# Patient Record
Sex: FEMALE | Race: WHITE | Employment: FULL TIME | URBAN - METROPOLITAN AREA
[De-identification: names, ages, dates, MRNs, and addresses within clinical notes are randomized per-mention and may not be internally consistent; named-entity substitution may affect disease eponyms.]

---

## 2018-05-09 ENCOUNTER — ANESTHESIA EVENT (OUTPATIENT)
Dept: GASTROENTEROLOGY | Facility: CLINIC | Age: 58
End: 2018-05-09
Payer: COMMERCIAL

## 2018-05-09 NOTE — ANESTHESIA PREPROCEDURE EVALUATION
Anesthesia Evaluation     . Pt has had prior anesthetic.            ROS/MED HX    ENT/Pulmonary:     (+)tobacco use, Current use , . .    Neurologic:       Cardiovascular:  - neg cardiovascular ROS       METS/Exercise Tolerance:     Hematologic:  - neg hematologic  ROS       Musculoskeletal:   (+) , , other musculoskeletal- lumbar stenosis       GI/Hepatic:  - neg GI/hepatic ROS       Renal/Genitourinary:  - ROS Renal section negative       Endo:     (+) type II DM .      Psychiatric:  - neg psychiatric ROS       Infectious Disease:  - neg infectious disease ROS       Malignancy:      - no malignancy   Other:    - neg other ROS                 Physical Exam  Normal systems: cardiovascular, pulmonary and dental    Airway   Mallampati: II  TM distance: >3 FB  Neck ROM: full    Dental     Cardiovascular       Pulmonary                     Anesthesia Plan      History & Physical Review  History and physical reviewed and following examination; no interval change.    ASA Status:  2 .        Plan for MAC Reason for MAC:  Difficulty with conscious sedation (QS)         Postoperative Care      Consents  Anesthetic plan, risks, benefits and alternatives discussed with:  Patient..                          .   Health Maintenance Due   Topic Date Due    Lipid Panel  1966    Colonoscopy  02/02/2016    Influenza Vaccine  08/01/2017

## 2018-05-21 RX ORDER — LISINOPRIL AND HYDROCHLOROTHIAZIDE 12.5; 2 MG/1; MG/1
2 TABLET ORAL
COMMUNITY
Start: 2017-12-20 | End: 2024-05-13

## 2018-05-21 RX ORDER — ALBUTEROL SULFATE 90 UG/1
1-2 AEROSOL, METERED RESPIRATORY (INHALATION)
COMMUNITY
Start: 2017-12-20

## 2018-05-21 RX ORDER — METOPROLOL SUCCINATE 50 MG/1
50 TABLET, EXTENDED RELEASE ORAL
COMMUNITY
Start: 2017-12-20 | End: 2024-05-13

## 2018-05-21 RX ORDER — METFORMIN HCL 500 MG
1000 TABLET, EXTENDED RELEASE 24 HR ORAL
COMMUNITY
Start: 2017-12-20 | End: 2024-05-13

## 2018-05-21 RX ORDER — ATORVASTATIN CALCIUM 40 MG/1
40 TABLET, FILM COATED ORAL
COMMUNITY
Start: 2017-12-20 | End: 2024-05-13

## 2018-05-21 RX ORDER — ASPIRIN 81 MG/1
TABLET ORAL
COMMUNITY
Start: 2012-09-05 | End: 2024-05-13

## 2018-05-22 ASSESSMENT — LIFESTYLE VARIABLES: TOBACCO_USE: 1

## 2018-05-23 ENCOUNTER — ANESTHESIA (OUTPATIENT)
Dept: GASTROENTEROLOGY | Facility: CLINIC | Age: 58
End: 2018-05-23
Payer: COMMERCIAL

## 2018-05-23 ENCOUNTER — HOSPITAL ENCOUNTER (OUTPATIENT)
Facility: CLINIC | Age: 58
Discharge: HOME | End: 2018-05-23
Attending: SURGERY | Admitting: SURGERY
Payer: COMMERCIAL

## 2018-05-23 ENCOUNTER — SURGERY (OUTPATIENT)
Age: 58
End: 2018-05-23

## 2018-05-23 VITALS
SYSTOLIC BLOOD PRESSURE: 133 MMHG | HEIGHT: 63 IN | OXYGEN SATURATION: 93 % | DIASTOLIC BLOOD PRESSURE: 73 MMHG | RESPIRATION RATE: 16 BRPM | BODY MASS INDEX: 35.44 KG/M2 | TEMPERATURE: 98.8 F | WEIGHT: 200 LBS

## 2018-05-23 LAB
COLONOSCOPY: NORMAL
GLUCOSE BLDC GLUCOMTR-MCNC: 342 MG/DL (ref 70–99)

## 2018-05-23 PROCEDURE — 45378 DIAGNOSTIC COLONOSCOPY: CPT | Performed by: SURGERY

## 2018-05-23 PROCEDURE — G0121 COLON CA SCRN NOT HI RSK IND: HCPCS | Performed by: SURGERY

## 2018-05-23 PROCEDURE — 37000008 ZZH ANESTHESIA TECHNICAL FEE, 1ST 30 MIN: Performed by: SURGERY

## 2018-05-23 PROCEDURE — 25000125 ZZHC RX 250: Performed by: NURSE ANESTHETIST, CERTIFIED REGISTERED

## 2018-05-23 PROCEDURE — 25000128 H RX IP 250 OP 636: Performed by: NURSE ANESTHETIST, CERTIFIED REGISTERED

## 2018-05-23 PROCEDURE — 82962 GLUCOSE BLOOD TEST: CPT

## 2018-05-23 PROCEDURE — 25000128 H RX IP 250 OP 636: Performed by: SURGERY

## 2018-05-23 RX ORDER — SODIUM CHLORIDE, SODIUM LACTATE, POTASSIUM CHLORIDE, CALCIUM CHLORIDE 600; 310; 30; 20 MG/100ML; MG/100ML; MG/100ML; MG/100ML
INJECTION, SOLUTION INTRAVENOUS CONTINUOUS
Status: DISCONTINUED | OUTPATIENT
Start: 2018-05-23 | End: 2018-05-23 | Stop reason: HOSPADM

## 2018-05-23 RX ORDER — ONDANSETRON 2 MG/ML
4 INJECTION INTRAMUSCULAR; INTRAVENOUS
Status: DISCONTINUED | OUTPATIENT
Start: 2018-05-23 | End: 2018-05-23 | Stop reason: HOSPADM

## 2018-05-23 RX ORDER — LIDOCAINE HYDROCHLORIDE 10 MG/ML
INJECTION, SOLUTION INFILTRATION; PERINEURAL PRN
Status: DISCONTINUED | OUTPATIENT
Start: 2018-05-23 | End: 2018-05-23

## 2018-05-23 RX ORDER — PROPOFOL 10 MG/ML
INJECTION, EMULSION INTRAVENOUS PRN
Status: DISCONTINUED | OUTPATIENT
Start: 2018-05-23 | End: 2018-05-23

## 2018-05-23 RX ORDER — PROPOFOL 10 MG/ML
INJECTION, EMULSION INTRAVENOUS CONTINUOUS PRN
Status: DISCONTINUED | OUTPATIENT
Start: 2018-05-23 | End: 2018-05-23

## 2018-05-23 RX ORDER — LIDOCAINE 40 MG/G
CREAM TOPICAL
Status: DISCONTINUED | OUTPATIENT
Start: 2018-05-23 | End: 2018-05-23 | Stop reason: HOSPADM

## 2018-05-23 RX ADMIN — LIDOCAINE HYDROCHLORIDE 50 MG: 10 INJECTION, SOLUTION INFILTRATION; PERINEURAL at 10:36

## 2018-05-23 RX ADMIN — PROPOFOL 200 MCG/KG/MIN: 10 INJECTION, EMULSION INTRAVENOUS at 10:35

## 2018-05-23 RX ADMIN — PROPOFOL 100 MG: 10 INJECTION, EMULSION INTRAVENOUS at 10:36

## 2018-05-23 RX ADMIN — SODIUM CHLORIDE, POTASSIUM CHLORIDE, SODIUM LACTATE AND CALCIUM CHLORIDE: 600; 310; 30; 20 INJECTION, SOLUTION INTRAVENOUS at 09:35

## 2018-05-23 RX ADMIN — LIDOCAINE HYDROCHLORIDE 0.5 ML: 10 INJECTION, SOLUTION EPIDURAL; INFILTRATION; INTRACAUDAL; PERINEURAL at 09:36

## 2018-05-23 NOTE — ANESTHESIA CARE TRANSFER NOTE
Patient: Viktoriya Handleyo    Procedure(s):  colonoscopy - Wound Class: II-Clean Contaminated    Diagnosis: screening  Diagnosis Additional Information: No value filed.    Anesthesia Type:   MAC     Note:  Airway :Room Air  Patient transferred to:Phase II  Handoff Report: Identifed the Patient, Identified the Reponsible Provider, Reviewed the pertinent medical history, Discussed the surgical course, Reviewed Intra-OP anesthesia mangement and issues during anesthesia, Set expectations for post-procedure period and Allowed opportunity for questions and acknowledgement of understanding      Vitals: (Last set prior to Anesthesia Care Transfer)    CRNA VITALS  5/23/2018 1026 - 5/23/2018 1056      5/23/2018             Pulse: 74    Ht Rate: 75    SpO2: 93 %                Electronically Signed By: PUJA Doshi CRNA  May 23, 2018  10:56 AM

## 2018-05-23 NOTE — ANESTHESIA POSTPROCEDURE EVALUATION
Patient: Viktoriya Emery    Procedure(s):  colonoscopy - Wound Class: II-Clean Contaminated    Diagnosis:screening  Diagnosis Additional Information: No value filed.    Anesthesia Type:  MAC    Note:  Anesthesia Post Evaluation    Patient location during evaluation: Bedside  Patient participation: Able to fully participate in evaluation  Level of consciousness: awake  Pain management: adequate  Airway patency: patent  Cardiovascular status: acceptable  Respiratory status: acceptable  Hydration status: acceptable  PONV: none     Anesthetic complications: None          Last vitals:  Vitals:    05/23/18 0913   BP: 141/74   Resp: 16   Temp: 37.1  C (98.8  F)   SpO2: 96%         Electronically Signed By: PUJA Doshi CRNA  May 23, 2018  10:57 AM

## 2023-04-01 ENCOUNTER — TRANSFERRED RECORDS (OUTPATIENT)
Dept: MULTI SPECIALTY CLINIC | Facility: CLINIC | Age: 63
End: 2023-04-01

## 2024-05-13 ENCOUNTER — OFFICE VISIT (OUTPATIENT)
Dept: FAMILY MEDICINE | Facility: CLINIC | Age: 64
End: 2024-05-13
Payer: COMMERCIAL

## 2024-05-13 VITALS
RESPIRATION RATE: 16 BRPM | OXYGEN SATURATION: 95 % | WEIGHT: 207.8 LBS | HEIGHT: 63 IN | BODY MASS INDEX: 36.82 KG/M2 | DIASTOLIC BLOOD PRESSURE: 80 MMHG | SYSTOLIC BLOOD PRESSURE: 150 MMHG | TEMPERATURE: 97.8 F | HEART RATE: 80 BPM

## 2024-05-13 DIAGNOSIS — E78.5 HYPERLIPIDEMIA LDL GOAL <100: ICD-10-CM

## 2024-05-13 DIAGNOSIS — I10 PRIMARY HYPERTENSION: ICD-10-CM

## 2024-05-13 DIAGNOSIS — Z79.4 TYPE 2 DIABETES MELLITUS WITH RETINOPATHY AND MACULAR EDEMA, WITH LONG-TERM CURRENT USE OF INSULIN, UNSPECIFIED LATERALITY, UNSPECIFIED RETINOPATHY SEVERITY (H): Primary | ICD-10-CM

## 2024-05-13 DIAGNOSIS — E66.812 CLASS 2 SEVERE OBESITY DUE TO EXCESS CALORIES WITH SERIOUS COMORBIDITY AND BODY MASS INDEX (BMI) OF 37.0 TO 37.9 IN ADULT (H): ICD-10-CM

## 2024-05-13 DIAGNOSIS — E66.01 CLASS 2 SEVERE OBESITY DUE TO EXCESS CALORIES WITH SERIOUS COMORBIDITY AND BODY MASS INDEX (BMI) OF 37.0 TO 37.9 IN ADULT (H): ICD-10-CM

## 2024-05-13 DIAGNOSIS — E11.311 TYPE 2 DIABETES MELLITUS WITH RETINOPATHY AND MACULAR EDEMA, WITH LONG-TERM CURRENT USE OF INSULIN, UNSPECIFIED LATERALITY, UNSPECIFIED RETINOPATHY SEVERITY (H): Primary | ICD-10-CM

## 2024-05-13 LAB
ANION GAP SERPL CALCULATED.3IONS-SCNC: 12 MMOL/L (ref 7–15)
BUN SERPL-MCNC: 16.4 MG/DL (ref 8–23)
CALCIUM SERPL-MCNC: 9.8 MG/DL (ref 8.8–10.2)
CHLORIDE SERPL-SCNC: 100 MMOL/L (ref 98–107)
CHOLEST SERPL-MCNC: 87 MG/DL
CREAT SERPL-MCNC: 0.94 MG/DL (ref 0.51–0.95)
DEPRECATED HCO3 PLAS-SCNC: 29 MMOL/L (ref 22–29)
EGFRCR SERPLBLD CKD-EPI 2021: 68 ML/MIN/1.73M2
FASTING STATUS PATIENT QL REPORTED: YES
FASTING STATUS PATIENT QL REPORTED: YES
GLUCOSE SERPL-MCNC: 338 MG/DL (ref 70–99)
HBA1C MFR BLD: 11.6 % (ref 0–5.6)
HDLC SERPL-MCNC: 30 MG/DL
LDLC SERPL CALC-MCNC: 5 MG/DL
NONHDLC SERPL-MCNC: 57 MG/DL
POTASSIUM SERPL-SCNC: 4.5 MMOL/L (ref 3.4–5.3)
SODIUM SERPL-SCNC: 141 MMOL/L (ref 135–145)
TRIGL SERPL-MCNC: 259 MG/DL

## 2024-05-13 PROCEDURE — 80061 LIPID PANEL: CPT | Performed by: NURSE PRACTITIONER

## 2024-05-13 PROCEDURE — 36415 COLL VENOUS BLD VENIPUNCTURE: CPT | Performed by: NURSE PRACTITIONER

## 2024-05-13 PROCEDURE — 80048 BASIC METABOLIC PNL TOTAL CA: CPT | Performed by: NURSE PRACTITIONER

## 2024-05-13 PROCEDURE — 83036 HEMOGLOBIN GLYCOSYLATED A1C: CPT | Performed by: NURSE PRACTITIONER

## 2024-05-13 PROCEDURE — 99204 OFFICE O/P NEW MOD 45 MIN: CPT | Performed by: NURSE PRACTITIONER

## 2024-05-13 RX ORDER — PEN NEEDLE, DIABETIC 32GX 5/32"
NEEDLE, DISPOSABLE MISCELLANEOUS SEE ADMIN INSTRUCTIONS
COMMUNITY
Start: 2024-03-28 | End: 2024-09-23

## 2024-05-13 RX ORDER — METOPROLOL SUCCINATE 50 MG/1
50 TABLET, EXTENDED RELEASE ORAL DAILY
Qty: 90 TABLET | Refills: 3 | Status: SHIPPED | OUTPATIENT
Start: 2024-05-13

## 2024-05-13 RX ORDER — BLOOD-GLUCOSE SENSOR
1 EACH MISCELLANEOUS
Qty: 6 EACH | Refills: 5 | Status: SHIPPED | OUTPATIENT
Start: 2024-05-13

## 2024-05-13 RX ORDER — LISINOPRIL AND HYDROCHLOROTHIAZIDE 12.5; 2 MG/1; MG/1
2 TABLET ORAL DAILY
Qty: 180 TABLET | Refills: 3 | Status: SHIPPED | OUTPATIENT
Start: 2024-05-13

## 2024-05-13 RX ORDER — INSULIN GLARGINE 100 [IU]/ML
INJECTION, SOLUTION SUBCUTANEOUS
COMMUNITY
End: 2024-05-13

## 2024-05-13 RX ORDER — LANCETS
EACH MISCELLANEOUS
COMMUNITY
Start: 2023-11-29

## 2024-05-13 RX ORDER — INSULIN GLARGINE 100 [IU]/ML
80 INJECTION, SOLUTION SUBCUTANEOUS 2 TIMES DAILY
Qty: 144 ML | Refills: 0 | Status: SHIPPED | OUTPATIENT
Start: 2024-05-13 | End: 2024-08-07

## 2024-05-13 RX ORDER — KETOROLAC TROMETHAMINE 30 MG/ML
1 INJECTION, SOLUTION INTRAMUSCULAR; INTRAVENOUS ONCE
Qty: 1 EACH | Refills: 0 | Status: SHIPPED | OUTPATIENT
Start: 2024-05-13 | End: 2024-05-13

## 2024-05-13 RX ORDER — BLOOD-GLUCOSE METER
EACH MISCELLANEOUS DAILY
COMMUNITY
Start: 2023-11-29

## 2024-05-13 RX ORDER — LIRAGLUTIDE 6 MG/ML
INJECTION SUBCUTANEOUS
COMMUNITY
End: 2024-05-13

## 2024-05-13 RX ORDER — ATORVASTATIN CALCIUM 40 MG/1
40 TABLET, FILM COATED ORAL DAILY
Qty: 90 TABLET | Refills: 3 | Status: SHIPPED | OUTPATIENT
Start: 2024-05-13

## 2024-05-13 SDOH — HEALTH STABILITY: PHYSICAL HEALTH: ON AVERAGE, HOW MANY MINUTES DO YOU ENGAGE IN EXERCISE AT THIS LEVEL?: 10 MIN

## 2024-05-13 SDOH — HEALTH STABILITY: PHYSICAL HEALTH: ON AVERAGE, HOW MANY DAYS PER WEEK DO YOU ENGAGE IN MODERATE TO STRENUOUS EXERCISE (LIKE A BRISK WALK)?: 2 DAYS

## 2024-05-13 ASSESSMENT — PAIN SCALES - GENERAL: PAINLEVEL: NO PAIN (0)

## 2024-05-13 ASSESSMENT — SOCIAL DETERMINANTS OF HEALTH (SDOH): HOW OFTEN DO YOU GET TOGETHER WITH FRIENDS OR RELATIVES?: NEVER

## 2024-05-13 NOTE — PROGRESS NOTES
Assessment & Plan     Type 2 diabetes mellitus with retinopathy and macular edema, with long-term current use of insulin, unspecified laterality, unspecified retinopathy severity (H)  Refilled Metformin, lantus and glucose supplies.  Ordered referral for diabetic education and freestyle mily reader for CBM.  I feel that her lantus dose is too high and that we may need to take a little closer look at her blood sugars in how they are doing throughout the day.  I would like to try to do Mounjaro if possible and insurance would pay to see if this improves blood sugars and see if we can reduce the lantus or reduce lantus and add short acting insulin at meals pending her tracking on the mily.  For now she can stay on the lantus and metformin until we find out her blood sugars through the day.  - metFORMIN (GLUCOPHAGE) 1000 MG tablet; Take 1,000 mg by mouth 2 times daily (with meals)  - blood glucose monitoring (SOFTCLIX) lancets; USE 1 TO CHECK GLUCOSE ONCE DAILY  - BD PEN NEEDLE PAWEL 2ND GEN 32G X 4 MM miscellaneous; See Admin Instructions  - Glucagon (GVOKE HYPOPEN) 1 MG/0.2ML pen; Inject subcutaneous.  - Blood Glucose Monitoring Suppl (ACCU-CHEK GUIDE ME) w/Device KIT; daily  - Hemoglobin A1c; Future  - tirzepatide (MOUNJARO) 2.5 MG/0.5ML pen; Inject 2.5 mg Subcutaneous every 7 days  - Continuous Glucose  (FREESTYLE MILY 3 READER) GLORIA; 1 each once for 1 dose Use to read blood sugars per 's instructions.  - Continuous Glucose Sensor (FREESTYLE MILY 3 SENSOR) MISC; 1 each every 14 days Use 1 sensor every 14 days. Use to read blood sugars per 's instructions.  - LANTUS SOLOSTAR 100 UNIT/ML soln; Inject 80 Units Subcutaneous 2 times daily for 90 days  - Adult Diabetes Education  Referral; Future  - Hemoglobin A1c    Class 2 severe obesity due to excess calories with serious comorbidity and body mass index (BMI) of 37.0 to 37.9 in adult (H)  - tirzepatide (MOUNJARO) 2.5 MG/0.5ML  pen; Inject 2.5 mg Subcutaneous every 7 days    Primary hypertension  BmP ordered for monitoring electrolytes and kidney function.  Refilled zestoretic and metoprolol.  BP's have been stable at home but elevate when she is here.  Advised to follow-up if they start to go above 140/90 consistently.  - Basic metabolic panel  (Ca, Cl, CO2, Creat, Gluc, K, Na, BUN); Future  - lisinopril-hydrochlorothiazide (ZESTORETIC) 20-12.5 MG tablet; Take 2 tablets by mouth daily  - metoprolol succinate ER (TOPROL XL) 50 MG 24 hr tablet; Take 1 tablet (50 mg) by mouth daily  - Basic metabolic panel  (Ca, Cl, CO2, Creat, Gluc, K, Na, BUN)    Hyperlipidemia LDL goal <100  Lipid ordered for monitoring cholesterol.  Refilled Lipitor for 1 year.  - Lipid panel reflex to direct LDL Non-fasting; Future  - atorvastatin (LIPITOR) 40 MG tablet; Take 1 tablet (40 mg) by mouth daily  - Lipid panel reflex to direct LDL Non-fasting    See Patient Instructions    Zi Sadler is a 63 year old, presenting for the following:  Physical        5/13/2024     1:16 PM   Additional Questions   Roomed by rmb   Accompanied by self         5/13/2024     1:16 PM   Patient Reported Additional Medications   Patient reports taking the following new medications none        Via the Health Maintenance questionnaire, the patient has reported the following services have been completed -Mammogram, this information has been sent to the abstraction team.  Health Care Directive  Patient does not have a Health Care Directive or Living Will: Discussed advance care planning with patient; information given to patient to review.    HPI    Diabetes Follow-up    How often are you checking your blood sugar? Not at all  What concerns do you have today about your diabetes? None   Do you have any of these symptoms? (Select all that apply)  Numbness in feet and Redness, sores, or blisters on feet  Patient has a healing blister on the right posterior heel that is not infected,  "there is moderate swelling around the ankle from this.    Hyperlipidemia Follow-Up    Are you regularly taking any medication or supplement to lower your cholesterol?   Yes- as she can due to health insurance issues  Are you having muscle aches or other side effects that you think could be caused by your cholesterol lowering medication?  No    Hypertension Follow-up    Do you check your blood pressure regularly outside of the clinic? Yes   Are you following a low salt diet? Yes  Are your blood pressures ever more than 140 on the top number (systolic) OR more   than 90 on the bottom number (diastolic), for example 140/90? Yes    BP Readings from Last 2 Encounters:   05/13/24 (!) 150/80   05/23/18 133/73       Review of Systems  CONSTITUTIONAL: NEGATIVE for fever, chills, change in weight  INTEGUMENTARY/SKIN: POSITIVE for normal healing blister on back of right heel from new shoes  RESP: NEGATIVE for significant cough or SOB  CV: NEGATIVE for chest pain, palpitations or peripheral edema  PSYCHIATRIC: NEGATIVE for changes in mood or affect  ROS otherwise negative     Objective    Exam  BP (!) 150/80   Pulse 80   Temp 97.8  F (36.6  C) (Tympanic)   Resp 16   Ht 1.588 m (5' 2.5\")   Wt 94.3 kg (207 lb 12.8 oz)   SpO2 95%   BMI 37.40 kg/m     Estimated body mass index is 37.4 kg/m  as calculated from the following:    Height as of this encounter: 1.588 m (5' 2.5\").    Weight as of this encounter: 94.3 kg (207 lb 12.8 oz).    Physical Exam  GENERAL: alert and no distress  EYES: Eyes grossly normal to inspection, PERRL and conjunctivae and sclerae normal  HENT: ear canals and TM's normal, nose and mouth without ulcers or lesions  NECK: no adenopathy, no asymmetry, masses, or scars  RESP: lungs clear to auscultation - no rales, rhonchi or wheezes  CV: regular rate and rhythm, normal S1 S2, no S3 or S4, no murmur, click or rub, no peripheral edema  ABDOMEN: soft, nontender, no hepatosplenomegaly, no masses and bowel " sounds normal  MS: no gross musculoskeletal defects noted, no edema  SKIN: healing blister on posterior right heel, scab with some redness, no warmth and swelling in the ankle 1+ pitting  NEURO: Normal strength and tone, mentation intact and speech normal  BACK: no CVA tenderness, no paralumbar tenderness  PSYCH: mentation appears normal, affect normal/bright  LYMPH: normal ant/post cervical, supraclavicular nodes  Diabetic foot exam: normal DP and PT pulses, no trophic changes or ulcerative lesions, and blister scabbed and healing on the right posterior heel, pt has some change in sensation in the pinky toe of the right foot and heel and on the left foot sensation changes in the heel      Signed Electronically by: Yessi Gray NP

## 2024-05-13 NOTE — PATIENT INSTRUCTIONS
Refilled your medications today.  Ordered Freestyle joselyn continuous blood glucose monitor.  Watch BP at home if elevated consistently over 140/90 follow-up in clinic.  Pending A1C recheck in 3-6 months. Work with diabetic education on insulin and we will try to add in Mounjaro to see if this is covered and reduce need for insulin.

## 2024-05-14 DIAGNOSIS — E66.01 CLASS 2 SEVERE OBESITY DUE TO EXCESS CALORIES WITH SERIOUS COMORBIDITY AND BODY MASS INDEX (BMI) OF 37.0 TO 37.9 IN ADULT (H): ICD-10-CM

## 2024-05-14 DIAGNOSIS — Z79.4 TYPE 2 DIABETES MELLITUS WITH RETINOPATHY AND MACULAR EDEMA, WITH LONG-TERM CURRENT USE OF INSULIN, UNSPECIFIED LATERALITY, UNSPECIFIED RETINOPATHY SEVERITY (H): ICD-10-CM

## 2024-05-14 DIAGNOSIS — E66.812 CLASS 2 SEVERE OBESITY DUE TO EXCESS CALORIES WITH SERIOUS COMORBIDITY AND BODY MASS INDEX (BMI) OF 37.0 TO 37.9 IN ADULT (H): ICD-10-CM

## 2024-05-14 DIAGNOSIS — E11.311 TYPE 2 DIABETES MELLITUS WITH RETINOPATHY AND MACULAR EDEMA, WITH LONG-TERM CURRENT USE OF INSULIN, UNSPECIFIED LATERALITY, UNSPECIFIED RETINOPATHY SEVERITY (H): ICD-10-CM

## 2024-05-15 ENCOUNTER — TELEPHONE (OUTPATIENT)
Dept: FAMILY MEDICINE | Facility: CLINIC | Age: 64
End: 2024-05-15
Payer: COMMERCIAL

## 2024-05-15 DIAGNOSIS — Z79.4 TYPE 2 DIABETES MELLITUS WITH RETINOPATHY AND MACULAR EDEMA, WITH LONG-TERM CURRENT USE OF INSULIN, UNSPECIFIED LATERALITY, UNSPECIFIED RETINOPATHY SEVERITY (H): Primary | ICD-10-CM

## 2024-05-15 DIAGNOSIS — E11.311 TYPE 2 DIABETES MELLITUS WITH RETINOPATHY AND MACULAR EDEMA, WITH LONG-TERM CURRENT USE OF INSULIN, UNSPECIFIED LATERALITY, UNSPECIFIED RETINOPATHY SEVERITY (H): Primary | ICD-10-CM

## 2024-05-15 NOTE — TELEPHONE ENCOUNTER
Medication Question or Refill       Yessi Gray NP  5/13/2024  5:04 PM CDT       Your triglycerides are elevated.  Start watching your fat intake in your diet and try using olive oil instead of butter for cooking, etc.  Your electrolytes and kidney function are normal except your blood sugar is elevated and your A1C is elevated to 11.6%. Start the Mounjaro and follow-up with diabetic education as soon as you can to work further on insulin until we get clearance for the new medication, we may need to start mealtime short acting insulin until you get on the Mounjaro and it starts working for you.  Continue to watch your diet to reduce sugar and carbs.  Plan to recheck with me in 3 months or sooner if blood sugars are not coming down.  Yessi Gray NP on 5/13/2024 at 5:03 PM     Patient asking about short acting insulin, scheduled follow up for Aug. 15. Transferred patient to diabetic education. She explained that mounjaro needs a prior auth/availability.      [facility] [address] [place] [address] - [address]   [address] [facility] [address]  Phone: [phone] Fax: [phone]    Okay to leave a detailed message?: Yes at Cell number on file:    Telephone Information:   Mobile [phone]

## 2024-05-15 NOTE — LETTER
Bria 3, 2024      Viktoriya Land  [address] [facility] [address]        Attn: Appeals    Please reconsider coverage of patient's Mounjaro prescription. Patient has tried victoza and was not successful on this which is the same GLP-1 as Trulicity and the Mounjaro provides GLP-1 plus GIP to offer more coverage for diabetes.         Sincerely,        Yessi Gray, NP

## 2024-05-16 NOTE — TELEPHONE ENCOUNTER
Prior Authorization Retail Medication Request    Medication/Dose: mounjaro  Diagnosis and ICD code (if different than what is on RX):    New/renewal/insurance change PA/secondary ins. PA:  Previously Tried and Failed:  Levemir; lantus; metformin; victoza  Rationale:      Insurance   Primary: not provided  Insurance ID:  not provided  CMM Key: [id number]    Secondary (if applicable):  Insurance ID:      Pharmacy Information (if different than what is on RX)  Name:    Phone:    Fax:

## 2024-05-17 NOTE — TELEPHONE ENCOUNTER
Please see messages below.    Would you like to start mealtime short acting insulin until you get on the Mounjaro?    The PA has been started.    Thank you    Yessi MOONEY RN

## 2024-05-17 NOTE — TELEPHONE ENCOUNTER
Patient is not compliant taking her insulin daily which is why I am trying to get her on the Mounjaro.      Let her know to continue to take her insulin nightly until she can start the Mounjaro and then she can wean down if this is improving her numbers.  She should have an appointment set up with diabetic education to help get started on this and reducing her insulin.       Yessi Gray NP on 5/17/2024 at 11:10 AM

## 2024-05-29 NOTE — TELEPHONE ENCOUNTER
Retail Pharmacy Prior Authorization Team   Phone: [phone]    PA Initiation    Medication: MOUNJARO 2.5 MG/0.5ML SC SOPN  Insurance Company: [employer] ([employer]) - Phone [phone] Fax [phone]  Pharmacy Filling the Rx: [facility] [address] [place] [address] - [address]  Filling Pharmacy Phone: [phone]  Filling Pharmacy Fax:    Start Date: 5/29/2024

## 2024-05-29 NOTE — TELEPHONE ENCOUNTER
Note: Due to record-high volumes, our turn-around time is taking longer than usual . We are currently 2 weeks behind in the pools.   We are working diligently to submit all requests in a timely manner and in the order they are received. Please only flag TRUE URGENT requests as high priority to the pool at this time.   If you have questions on status of PA's,  please send a note/message in the active PA encounter and send back to the [facility] PA pool [[phone]].    If you have questions about the turn-around time or about our process, please reach out to our supervisor Radha Guerra.   Thank you!   RPPA (Retail Pharmacy Prior Authorization) team    We are currently submitting requests we received on 05/16/2024    PRIOR AUTHORIZATION DENIED    Medication: MOUNJARO 2.5 MG/0.5ML SC SOPN  Insurance Company: [employer] ([employer]) - Phone [phone] Fax [phone]  Denial Date: 5/29/2024  Denial Rational:         Appeal Information:   If provider would like to appeal please review the plan's reasons for denial listed above. Please utilize that information to complete letter and provide specific, detailed clinical information/rationale of your patient's health status to address their denial reasons.    Patient Notified: No

## 2024-05-30 NOTE — TELEPHONE ENCOUNTER
I would like to push through a letter to the insurance company since patient has tried victoza and was not successful on this which is the same GLP-1 as Trulicity and the Mounjaro provides GLP-1 plus GIP to offer more coverage for diabetes.  Yessi Gray NP on 5/30/2024 at 7:32 AM

## 2024-06-04 NOTE — TELEPHONE ENCOUNTER
This looks great.  Please proceed with sending this to the insurance company.  Yessi Gray NP on 6/4/2024 at 4:51 AM

## 2024-06-04 NOTE — TELEPHONE ENCOUNTER
Yessi,  Please see appeal letter that I started and add/delete as needed.  Thank you,  Luci Leyva    (Sorry for delay I was on vacation)

## 2024-06-06 NOTE — TELEPHONE ENCOUNTER
Medication Appeal Initiation    Medication: MOUNJARO 2.5 MG/0.5ML SC SOPN  Appeal Start Date:  6/6/2024  Insurance Company:   Insurance Phone:   Insurance Fax:   Comments:

## 2024-06-07 NOTE — TELEPHONE ENCOUNTER
Note: Due to record-high volumes, our turn-around time is taking longer than usual . We are currently 2 weeks behind in the pools.   We are working diligently to submit all requests in a timely manner and in the order they are received. Please only flag TRUE URGENT requests as high priority to the pool at this time.   If you have questions on status of PA's,  please send a note/message in the active PA encounter and send back to the [facility] PA pool [[phone]].    If you have questions about the turn-around time or about our process, please reach out to our supervisor Radha Guerra.   Thank you!   RPPA (Retail Pharmacy Prior Authorization) team    We are currently submitting requests we received on 05/29/2024    MEDICATION APPEAL DENIED    Medication: MOUNJARO 2.5 MG/0.5ML SC SOPN  Insurance Company:   Denial Date: 6/6/2024  Denial Reason(s):       Central Prior Authorization Team ONLY: Second level appeals will be managed by the clinic staff and provider. Please contact the [employer] Prior Authorization Team if additional information about the denial is needed.  Second Level Appeal Information:

## 2024-06-11 ENCOUNTER — TELEPHONE (OUTPATIENT)
Dept: FAMILY MEDICINE | Facility: CLINIC | Age: 64
End: 2024-06-11
Payer: COMMERCIAL

## 2024-06-11 NOTE — TELEPHONE ENCOUNTER
Contacted patient and let her know of the change in medication sent to pharmacy.   Understood with no questions.   Kathy Rivas, CMA

## 2024-06-11 NOTE — TELEPHONE ENCOUNTER
I have ordered the Trulicity.  Please notify patient of the change due to insurance.  Yessi Gray NP on 6/11/2024 at 7:54 AM

## 2024-06-12 NOTE — TELEPHONE ENCOUNTER
Prior Authorization Retail Medication Request    Medication/Dose: trulicity  Diagnosis and ICD code (if different than what is on RX):    New/renewal/insurance change PA/secondary ins. PA:  Previously Tried and Failed:  Levemir; lantus; metformin; victoza   Rationale:  per recent denial for mounjaro patient needed to try and fail Trulicity    Insurance   Primary: not provided  Insurance ID:  not provided  CM Key: [id number]    Secondary (if applicable):  Insurance ID:      Pharmacy Information (if different than what is on RX)  Name:    Phone:    Fax:

## 2024-06-19 NOTE — TELEPHONE ENCOUNTER
Retail Pharmacy Prior Authorization Team   Phone: [phone]    Prior Authorization Approval    Medication: TRULICITY 0.75 MG/0.5ML SC SOPN  Authorization Effective Date: 5/20/2024  Authorization Expiration Date: 6/19/2025     Insurance Company: [employer] Non-Specialty PA's - Phone [phone] Fax [phone]  Which Pharmacy is filling the prescription: [facility] [address] [place] [address] [address]  Pharmacy Notified: YES  Patient Notified: YES **Instructed pharmacy to notify patient when script is ready to /ship.**

## 2024-06-21 ENCOUNTER — TELEPHONE (OUTPATIENT)
Dept: FAMILY MEDICINE | Facility: CLINIC | Age: 64
End: 2024-06-21
Payer: COMMERCIAL

## 2024-06-21 NOTE — LETTER
June 21, 2024    To  Viktoriya Land  [address] [facility] [address]    Your team at [facility] cares about your health. We have reviewed your chart and based on our findings; we are making the following recommendations to better manage your health.     You are in particular need of attention regarding the following:     Schedule a primary care office visit with your provider for a Pap Smear to screen for Cervical Cancer.  PREVENTATIVE VISIT: Physical    If you have already completed these items, please contact the clinic via phone or   [employer] so your care team can review and update your records. Thank you for   choosing [facility] for your healthcare needs. For any questions,   concerns, or to schedule an appointment please contact our clinic.    Healthy Regards,      Your [facility] Care Team

## 2024-06-21 NOTE — TELEPHONE ENCOUNTER
Patient Quality Outreach    Patient is due for the following:   Cervical Cancer Screening - PAP Needed  Physical Preventive Adult Physical    Next Steps:   Pt to schedule    Type of outreach:    Sent letter.      Questions for provider review:    None           Daniel Guallpa

## 2024-08-07 DIAGNOSIS — Z79.4 TYPE 2 DIABETES MELLITUS WITH RETINOPATHY AND MACULAR EDEMA, WITH LONG-TERM CURRENT USE OF INSULIN, UNSPECIFIED LATERALITY, UNSPECIFIED RETINOPATHY SEVERITY (H): ICD-10-CM

## 2024-08-07 DIAGNOSIS — E11.311 TYPE 2 DIABETES MELLITUS WITH RETINOPATHY AND MACULAR EDEMA, WITH LONG-TERM CURRENT USE OF INSULIN, UNSPECIFIED LATERALITY, UNSPECIFIED RETINOPATHY SEVERITY (H): ICD-10-CM

## 2024-08-07 RX ORDER — INSULIN GLARGINE 100 [IU]/ML
INJECTION, SOLUTION SUBCUTANEOUS
Qty: 144 ML | Refills: 0 | Status: SHIPPED | OUTPATIENT
Start: 2024-08-07

## 2024-08-07 NOTE — TELEPHONE ENCOUNTER
Has appointment scheduled for 8/15/24.      Requested Prescriptions   Pending Prescriptions Disp Refills    LANTUS SOLOSTAR 100 UNIT/ML soln [Pharmacy Med Name: Lantus SoloStar 100 UNIT/ML Subcutaneous Solution Pen-injector] 144 mL 0     Sig: INJECT 80 UNITS SUBCUTANEOUSLY TWICE DAILY       Insulin Protocol Failed - 8/7/2024  5:15 PM        Failed - Chart Review Required     Review Chart.    Do not approve if insulin is used in a pump.  Instead, direct refill request to the patient's endocrinologist.  If the patient doesn't have an endocrinologist, then send the refill to the patient's PCP for review            Passed - Medication is active on med list        Passed - Has GFR on file in past 12 months and most recent value is normal        Passed - Recent (6 mo) or future (90 days) visit within the authorizing provider's specialty     The patient must have completed an in-person or virtual visit within the past 6 months or has a future visit scheduled within the next 90 days with the authorizing provider s specialty.  Urgent care and e-visits do not quality as an office visit for this protocol.          Passed - Medication indicated for associated diagnosis     Medication is associated with one or more of the following diagnoses:   - Type 1 diabetes mellitus  - Type 2 diabetes mellitus  - Diabetic nephropathy; Prophylaxis  - Neuropathy due to diabetes mellitus; Prophylaxis  - Retinopathy due to diabetes mellitus; Prophylaxis  - Diabetes mellitus associated with cystic fibrosis  - Disorder of cardiovascular system; Prophylaxis - Type 1 diabetes mellitus   - Disorder of cardiovascular system; Prophylaxis - Type 2 diabetes mellitus            Passed - Patient is 18 years of age or older

## 2024-09-19 ENCOUNTER — ALLIED HEALTH/NURSE VISIT (OUTPATIENT)
Dept: EDUCATION SERVICES | Facility: CLINIC | Age: 64
End: 2024-09-19
Payer: COMMERCIAL

## 2024-09-19 DIAGNOSIS — Z79.4 TYPE 2 DIABETES MELLITUS WITH RETINOPATHY AND MACULAR EDEMA, WITH LONG-TERM CURRENT USE OF INSULIN (H): Primary | ICD-10-CM

## 2024-09-19 DIAGNOSIS — Z79.4 TYPE 2 DIABETES MELLITUS WITH RETINOPATHY AND MACULAR EDEMA, WITH LONG-TERM CURRENT USE OF INSULIN, UNSPECIFIED LATERALITY, UNSPECIFIED RETINOPATHY SEVERITY (H): ICD-10-CM

## 2024-09-19 DIAGNOSIS — E11.311 TYPE 2 DIABETES MELLITUS WITH RETINOPATHY AND MACULAR EDEMA, WITH LONG-TERM CURRENT USE OF INSULIN (H): Primary | ICD-10-CM

## 2024-09-19 DIAGNOSIS — E11.311 TYPE 2 DIABETES MELLITUS WITH RETINOPATHY AND MACULAR EDEMA, WITH LONG-TERM CURRENT USE OF INSULIN, UNSPECIFIED LATERALITY, UNSPECIFIED RETINOPATHY SEVERITY (H): ICD-10-CM

## 2024-09-19 PROCEDURE — G0108 DIAB MANAGE TRN  PER INDIV: HCPCS | Performed by: DIETITIAN, REGISTERED

## 2024-09-19 RX ORDER — BLOOD SUGAR DIAGNOSTIC
STRIP MISCELLANEOUS
Qty: 100 STRIP | Refills: 11 | Status: SHIPPED | OUTPATIENT
Start: 2024-09-19

## 2024-09-19 NOTE — Clinical Note
9/19/2024         RE: Viktoriya Land  [address]  [facility] [address]        Dear Colleague,    Thank you for referring your patient, Viktoriya Land, to the [facility] [place]. Please see a copy of my visit note below.    Diabetes Self-Management Education & Support  Presents for: Individual review  Type of Service: In Person Visit      ASSESSMENT:  Transferred care from [facility] to [facility] with a change in insurance and established with Yessi Gray, will follow up next week.  Has worked with Diabetes ed at [facility] in the past.  Reviewed pertinent history.  Was on Victoza and did well on it now has switched to Trulicity (Mounjaro was denied).   Trulicity not in medication list; guessing it is the 0.75mg dose and can likely increase.       Takes Trulicity on Tuesdays or wednesdays and thinks it is 110$ for 4 pens.  Signed up for and printed the copay card today to see if this can reduce the copay.      BD needles were 25$/box.  Tries not to reuse needles much, however currently spending 1$/day.  Recommend asking the pharmacist for relion needles 50 for 9$     If capped at 35$ copay as it should be and on formulary; recommend switching toTresiba U200.  Dosed once daily instead of twice daily (less injections).   Ultra long acting - flatter background profile in action.   Plus savings with less needles.     First 2 joselyn sensors fell off but had purchased 6, so has 4 more. Reviewed use, adhesion, alarms and the reader.  These were more expensive (not covered or has a high deductible).  Discussed intermittent sensor use to reduce cost and if Trulicity and needle cost reduces may be able to use that savings for sensors.     Accu check testing strips refilled,  not checking routinely, but was 92 this morning and feeling it. With working in the last 10 days much much more active with new job.  Is likely treating lower numbers (will eat when feeling shaky/low mid  "morning)  Recommend decreasing insulin from 80-0-0-80 to 74-0-0-74 due to more exercise and will likely be increasing Trulicity dose soon.  With Gilberto data will be able to better assess.     Patient's most recent   Lab Results   Component Value Date    A1C 11.6 05/13/2024     is not meeting goal of <7.0    Diabetes knowledge and skills assessment:   Patient is knowledgeable in diabetes management concepts related to: Healthy Eating, Being Active, Monitoring, Taking Medication, Problem Solving, Reducing Risks, and Healthy Coping  Continue education with the following diabetes management concepts: Monitoring, Taking Medication, Problem Solving, and Reducing Risks  Based on learning assessment above, most appropriate setting for further diabetes education would be: Individual setting.      PLAN          SUBJECTIVE/OBJECTIVE:  Presents for: Individual review  Accompanied by: Self  Diabetes education in the past 24mo: No  Focus of Visit: Monitoring, Taking Medication  Diabetes type: Type 2  Other concerns:: None  Cultural Influences/Ethnic Background:  Choose not to answer    Diabetes Symptoms & Complications:  Diabetes Related Symptoms: Neuropathy, Yeast infection     Patient Problem List and Family Medical History reviewed for relevant medical history, current medical status, and diabetes risk factors.    Vitals:  There were no vitals taken for this visit.  Estimated body mass index is 37.4 kg/m  as calculated from the following:    Height as of 5/13/24: 1.588 m (5' 2.5\").    Weight as of 5/13/24: 94.3 kg (207 lb 12.8 oz).   Last 3 BP:   BP Readings from Last 3 Encounters:   05/13/24 (!) 150/80   05/23/18 133/73     History   Smoking Status    Not on file   Smokeless Tobacco    Not on file     Labs:  Lab Results   Component Value Date    A1C 11.6 05/13/2024     Lab Results   Component Value Date     05/13/2024     Lab Results   Component Value Date    LDL 5 05/13/2024     Direct Measure HDL   Date Value Ref " "Range Status   05/13/2024 30 (L) >=50 mg/dL Final   ]  GFR Estimate   Date Value Ref Range Status   05/13/2024 68 >60 mL/min/1.73m2 Final     No results found for: \"GFRESTBLACK\"  Lab Results   Component Value Date    CR 0.94 05/13/2024   No results found for: \"MICROALBUMIN\"    Healthy Eating:  Healthy Eating Assessed Today: Yes  Cultural/Synagogue diet restrictions?: No    Being Active:  Being Active Assessed Today: Yes  Barrier to exercise: None    Monitoring:  Monitoring Assessed Today: Yes  Blood Glucose Meter: Accu-chek, FreeStyle  Times checking blood sugar at home (number): Never    Taking Medications:  Diabetes Medication(s)       Biguanides       metFORMIN (GLUCOPHAGE) 1000 MG tablet Take 1,000 mg by mouth 2 times daily (with meals)       Diabetic Other       Glucagon (GVOKE HYPOPEN) 1 MG/0.2ML pen Inject subcutaneous.       Insulin       LANTUS SOLOSTAR 100 UNIT/ML soln INJECT 80 UNITS SUBCUTANEOUSLY TWICE DAILY          Taking Medication Assessed Today: Yes  Current Treatments: Insulin Injections, Oral Medication (taken by mouth)    Problem Solving:  Not assessed at this visit      Reducing Risks:  Reducing Risks Assessed Today: Yes  Has dilated eye exam at least once a year?: No  Sees dentist every 6 months?: No  Feet checked by healthcare provider in the last year?: Yes    Healthy Coping:  Healthy Coping Assessed Today: Yes  Emotional response to diabetes: Ready to learn  Informal Support system:: Family, Significant other  Stage of change: ACTION (Actively working towards change)  Support resources: [employer]  Patient Activation Measure Survey Score:       No data to display              Care Plan and Education Provided:  Healthy Eating: Balanced meals, Being Active: Precautions to take with exercise and Relationship of activity to glucose, Monitoring: Blood glucose versus Continuous Glucose Monitoring, Frequency of monitoring, and Individual glucose targets, Taking Medication: Action of prescribed " medication(s) and Side effects of prescribed medication(s), Problem Solving: High glucose - causes, signs/symptoms, treatment and prevention, Low glucose - causes, signs/symptoms, treatment and prevention, Rule of 15 and carrying a carbohydrate source at all times in case of low glucose, and When to call a health care provider, and Reducing Risks: A1C - goals, relating to blood glucose levels, how often to check    Rachel Rosales RD, LD, [facility]  Diabetes Education    Time Spent: 45 minutes  Encounter Type: Individual    Any diabetes medication dose changes were made via the CDE Protocol per the patient's referring provider. A copy of this encounter was shared with the provider.            Diabetes Self-Management Education & Support  Presents for: Individual review  Type of Service: In Person Visit      ASSESSMENT:  Transferred care from [facility] to [facility] with a change in insurance and established with Yessi Gray, will follow up next week.  Has worked with Diabetes ed at [facility] in the past.  Reviewed pertinent history.  Was on Victoza and did well on it now has switched to Trulicity (Mounjaro was denied).   Trulicity not in medication list; guessing it is the 0.75mg dose; takes on Tuesdays or wednesdays and thinks it is 110$ for 4 pens.  Signed up for and printed the copay card today to see if this can reduce the copay of the Trulicity.   Can likely work Trulicity dose up further. Accu check testing strips refilled,  not checking blood sugars routinely, but was 92 this morning and feeling it. With working in the last 10 days much much more active with new job.  Is likely treating lower numbers (will eat when feeling shaky/low mid morning)  Recommend decreasing insulin from 80-0-0-80 to 74-0-0-74 due to more exercise and will likely be increasing Trulicity dose soon.  With Gilberto data will be able to better assess. First 2 gilberto sensors fell off but had purchased 6, so has 4 more. Reviewed use,  adhesion, alarms and the reader.  These were more expensive (not covered or has a high deductible).  Discussed intermittent sensor use to reduce cost and if Trulicity and needle cost reduces may be able to use that savings for sensors.   BD needles were 25$/box.  Tries not to reuse needles much, however currently spending 1$/day.  Recommend asking the pharmacist for relion needles 50 for 9$.  If capped at 35$ copay as it should be and on formulary; recommend switching toTresiba U200.  Dosed once daily instead of twice daily (less injections/less needles) and a better basal profile in action.  Recommend follow up after wearing a couple libres.  Viktoriya will call to schedule.       Patient's most recent   Lab Results   Component Value Date    A1C 11.6 05/13/2024     is not meeting goal of <7.0    Diabetes knowledge and skills assessment:   Patient is knowledgeable in diabetes management concepts related to: Healthy Eating, Being Active, Monitoring, Taking Medication, Problem Solving, Reducing Risks, and Healthy Coping  Continue education with the following diabetes management concepts: Monitoring, Taking Medication, Problem Solving, and Reducing Risks  Based on learning assessment above, most appropriate setting for further diabetes education would be: Individual setting.      PLAN  PCP visit 9/23; possibly increase Trulicity   Continue with positive diet & lifestyle changes    Resume FreeStyle Gilberto and make a follow up with Diabetes ed after wearing a couple of these   Consider changing from BID lantus to once daily Tresiba U200 (reduce total daily dose by 20% when making this switch)     SUBJECTIVE/OBJECTIVE:  Presents for: Individual review  Accompanied by: Self  Diabetes education in the past 24mo: No  Focus of Visit: Monitoring, Taking Medication  Diabetes type: Type 2  Other concerns:: None  Cultural Influences/Ethnic Background:  Choose not to answer    Diabetes Symptoms & Complications:  Diabetes Related  "Symptoms: Neuropathy, Yeast infection     Patient Problem List and Family Medical History reviewed for relevant medical history, current medical status, and diabetes risk factors.    Vitals:  There were no vitals taken for this visit.  Estimated body mass index is 37.4 kg/m  as calculated from the following:    Height as of 5/13/24: 1.588 m (5' 2.5\").    Weight as of 5/13/24: 94.3 kg (207 lb 12.8 oz).   Last 3 BP:   BP Readings from Last 3 Encounters:   05/13/24 (!) 150/80   05/23/18 133/73     History   Smoking Status     Not on file   Smokeless Tobacco     Not on file     Labs:  Lab Results   Component Value Date    A1C 11.6 05/13/2024     Lab Results   Component Value Date     05/13/2024     Lab Results   Component Value Date    LDL 5 05/13/2024     Direct Measure HDL   Date Value Ref Range Status   05/13/2024 30 (L) >=50 mg/dL Final   ]  GFR Estimate   Date Value Ref Range Status   05/13/2024 68 >60 mL/min/1.73m2 Final     No results found for: \"GFRESTBLACK\"  Lab Results   Component Value Date    CR 0.94 05/13/2024   No results found for: \"MICROALBUMIN\"    Healthy Eating:  Healthy Eating Assessed Today: Yes  Cultural/Mandaen diet restrictions?: No    Being Active:  Being Active Assessed Today: Yes  Barrier to exercise: None    Monitoring:  Monitoring Assessed Today: Yes  Blood Glucose Meter: Accu-chek, FreeStyle  Times checking blood sugar at home (number): Never    Taking Medications:  Diabetes Medication(s)       Biguanides       metFORMIN (GLUCOPHAGE) 1000 MG tablet Take 1,000 mg by mouth 2 times daily (with meals)       Diabetic Other       Glucagon (GVOKE HYPOPEN) 1 MG/0.2ML pen Inject subcutaneous.       Insulin       LANTUS SOLOSTAR 100 UNIT/ML soln INJECT 80 UNITS SUBCUTANEOUSLY TWICE DAILY          Taking Medication Assessed Today: Yes  Current Treatments: Insulin Injections, Oral Medication (taken by mouth)    Problem Solving:  Not assessed at this visit      Reducing Risks:  Reducing Risks " Assessed Today: Yes  Has dilated eye exam at least once a year?: No  Sees dentist every 6 months?: No  Feet checked by healthcare provider in the last year?: Yes    Healthy Coping:  Healthy Coping Assessed Today: Yes  Emotional response to diabetes: Ready to learn  Informal Support system:: Family, Significant other  Stage of change: ACTION (Actively working towards change)  Support resources: [employer]  Patient Activation Measure Survey Score:       No data to display              Care Plan and Education Provided:  Healthy Eating: Balanced meals, Being Active: Precautions to take with exercise and Relationship of activity to glucose, Monitoring: Blood glucose versus Continuous Glucose Monitoring, Frequency of monitoring, and Individual glucose targets, Taking Medication: Action of prescribed medication(s) and Side effects of prescribed medication(s), Problem Solving: High glucose - causes, signs/symptoms, treatment and prevention, Low glucose - causes, signs/symptoms, treatment and prevention, Rule of 15 and carrying a carbohydrate source at all times in case of low glucose, and When to call a health care provider, and Reducing Risks: A1C - goals, relating to blood glucose levels, how often to check    Rachel Rosales RD, RAJESH, [facility]  Diabetes Education    Time Spent: 45 minutes  Encounter Type: Individual    Any diabetes medication dose changes were made via the CDE Protocol per the patient's referring provider. A copy of this encounter was shared with the provider.

## 2024-09-19 NOTE — PATIENT INSTRUCTIONS
Ask the pharmacist for relion needles (over the counter.  See if they can get 4mm)           Trulicity savings card  - Yessi will likely increase the dose     Tresiba U200 - dosed once daily instead of twice daily (less injections!).   Ultra long acting - flatter background profile in action.  I will update Yessi on this possibility      Instead of 80 & 80  try 74 & 74 due to running lower in the morning     Keep up the great work!!         Fiber / gut health   https://health.[employer].org/gut-microbiome/  https://www.cdc.gov/diabetes/library/features/role-of-fiber.html  https://www.[employer]/newarchives/996522f05.shtml  https://www.[employer].org/healthy-lifestyle/recipes/high-fiber-recipes/rcs-28478389  https://[employer]Shield/easy-bean-and-lentil-recipes/  https://health.[employer].org/health-benefits-of-lentils/  https://health.[facility].org/whats-the-difference-between-soluble-and-insoluble-fiber/  https://www.[employer].org/healthy-lifestyle/nutrition-and-healthy-eating/in-depth/high-fiber-foods/art-27106012      Rachel Rosales RD, LD, [facility]  Diabetes Education    A Diabetes Education referral is good for a year and your provider can easily add another anytime.  We are here to help!  There are many areas a Diabetes Educator can help with including -  medication regimen review and adjustment, new medications, blood sugars, new technology, new meters, activity, food/diet, goal setting etc.       How to get a hold of us: Please reach out on [employer] anytime with questions.  Our direct scheduling line for appointments is: [phone].  We have virtual and in person options at many locations.  Additionally we have a non urgent M-F triage line for questions or to get a hold of a Diabetes educator : [phone]

## 2024-09-19 NOTE — PROGRESS NOTES
Diabetes Self-Management Education & Support  Presents for: Individual review  Type of Service: In Person Visit      ASSESSMENT:  Transferred care from [facility] to [facility] with a change in insurance and established with Yessi Gray, will follow up next week.  Has worked with Diabetes ed at [facility] in the past.  Reviewed pertinent history.  Was on Victoza and did well on it now has switched to Trulicity (Mounjaro was denied).   Trulicity not in medication list; guessing it is the 0.75mg dose; takes on Tuesdays or wednesdays and thinks it is 110$ for 4 pens.  Signed up for and printed the copay card today to see if this can reduce the copay of the Trulicity.   Can likely work Trulicity dose up further. Accu check testing strips refilled,  not checking blood sugars routinely, but was 92 this morning and feeling it. With working in the last 10 days much much more active with new job.  Is likely treating lower numbers (will eat when feeling shaky/low mid morning)  Recommend decreasing insulin from 80-0-0-80 to 74-0-0-74 due to more exercise and will likely be increasing Trulicity dose soon.  With Gilberto data will be able to better assess. First 2 gilberto sensors fell off but had purchased 6, so has 4 more. Reviewed use, adhesion, alarms and the reader.  These were more expensive (not covered or has a high deductible).  Discussed intermittent sensor use to reduce cost and if Trulicity and needle cost reduces may be able to use that savings for sensors.   BD needles were 25$/box.  Tries not to reuse needles much, however currently spending 1$/day.  Recommend asking the pharmacist for relion needles 50 for 9$.  If capped at 35$ copay as it should be and on formulary; recommend switching toTresiba U200.  Dosed once daily instead of twice daily (less injections/less needles) and a better basal profile in action.  Recommend follow up after wearing a couple libres.  Viktoriya will call to schedule.       Patient's most  "recent   Lab Results   Component Value Date    A1C 11.6 05/13/2024     is not meeting goal of <7.0    Diabetes knowledge and skills assessment:   Patient is knowledgeable in diabetes management concepts related to: Healthy Eating, Being Active, Monitoring, Taking Medication, Problem Solving, Reducing Risks, and Healthy Coping  Continue education with the following diabetes management concepts: Monitoring, Taking Medication, Problem Solving, and Reducing Risks  Based on learning assessment above, most appropriate setting for further diabetes education would be: Individual setting.      PLAN  PCP visit 9/23; possibly increase Trulicity   Continue with positive diet & lifestyle changes    Resume FreeStyle Gilberto and make a follow up with Diabetes ed after wearing a couple of these   Consider changing from BID lantus to once daily Tresiba U200 (reduce total daily dose by 20% when making this switch)     SUBJECTIVE/OBJECTIVE:  Presents for: Individual review  Accompanied by: Self  Diabetes education in the past 24mo: No  Focus of Visit: Monitoring, Taking Medication  Diabetes type: Type 2  Other concerns:: None  Cultural Influences/Ethnic Background:  Choose not to answer    Diabetes Symptoms & Complications:  Diabetes Related Symptoms: Neuropathy, Yeast infection     Patient Problem List and Family Medical History reviewed for relevant medical history, current medical status, and diabetes risk factors.    Vitals:  There were no vitals taken for this visit.  Estimated body mass index is 37.4 kg/m  as calculated from the following:    Height as of 5/13/24: 1.588 m (5' 2.5\").    Weight as of 5/13/24: 94.3 kg (207 lb 12.8 oz).   Last 3 BP:   BP Readings from Last 3 Encounters:   05/13/24 (!) 150/80   05/23/18 133/73     History   Smoking Status    Not on file   Smokeless Tobacco    Not on file     Labs:  Lab Results   Component Value Date    A1C 11.6 05/13/2024     Lab Results   Component Value Date     05/13/2024 " "    Lab Results   Component Value Date    LDL 5 05/13/2024     Direct Measure HDL   Date Value Ref Range Status   05/13/2024 30 (L) >=50 mg/dL Final   ]  GFR Estimate   Date Value Ref Range Status   05/13/2024 68 >60 mL/min/1.73m2 Final     No results found for: \"GFRESTBLACK\"  Lab Results   Component Value Date    CR 0.94 05/13/2024   No results found for: \"MICROALBUMIN\"    Healthy Eating:  Healthy Eating Assessed Today: Yes  Cultural/Nondenominational diet restrictions?: No    Being Active:  Being Active Assessed Today: Yes  Barrier to exercise: None    Monitoring:  Monitoring Assessed Today: Yes  Blood Glucose Meter: Accu-chek, FreeStyle  Times checking blood sugar at home (number): Never    Taking Medications:  Diabetes Medication(s)       Biguanides       metFORMIN (GLUCOPHAGE) 1000 MG tablet Take 1,000 mg by mouth 2 times daily (with meals)       Diabetic Other       Glucagon (GVOKE HYPOPEN) 1 MG/0.2ML pen Inject subcutaneous.       Insulin       LANTUS SOLOSTAR 100 UNIT/ML soln INJECT 80 UNITS SUBCUTANEOUSLY TWICE DAILY          Taking Medication Assessed Today: Yes  Current Treatments: Insulin Injections, Oral Medication (taken by mouth)    Problem Solving:  Not assessed at this visit      Reducing Risks:  Reducing Risks Assessed Today: Yes  Has dilated eye exam at least once a year?: No  Sees dentist every 6 months?: No  Feet checked by healthcare provider in the last year?: Yes    Healthy Coping:  Healthy Coping Assessed Today: Yes  Emotional response to diabetes: Ready to learn  Informal Support system:: Family, Significant other  Stage of change: ACTION (Actively working towards change)  Support resources: Websites  Patient Activation Measure Survey Score:       No data to display              Care Plan and Education Provided:  Healthy Eating: Balanced meals, Being Active: Precautions to take with exercise and Relationship of activity to glucose, Monitoring: Blood glucose versus Continuous Glucose Monitoring, " Frequency of monitoring, and Individual glucose targets, Taking Medication: Action of prescribed medication(s) and Side effects of prescribed medication(s), Problem Solving: High glucose - causes, signs/symptoms, treatment and prevention, Low glucose - causes, signs/symptoms, treatment and prevention, Rule of 15 and carrying a carbohydrate source at all times in case of low glucose, and When to call a health care provider, and Reducing Risks: A1C - goals, relating to blood glucose levels, how often to check    Rachel Rosales RD, RAJESH, [facility]  Diabetes Education    Time Spent: 45 minutes  Encounter Type: Individual    Any diabetes medication dose changes were made via the CDE Protocol per the patient's referring provider. A copy of this encounter was shared with the provider.

## 2024-09-23 ENCOUNTER — OFFICE VISIT (OUTPATIENT)
Dept: FAMILY MEDICINE | Facility: CLINIC | Age: 64
End: 2024-09-23
Payer: COMMERCIAL

## 2024-09-23 VITALS
TEMPERATURE: 98.4 F | HEIGHT: 63 IN | WEIGHT: 201.7 LBS | OXYGEN SATURATION: 97 % | HEART RATE: 73 BPM | BODY MASS INDEX: 35.74 KG/M2 | SYSTOLIC BLOOD PRESSURE: 128 MMHG | RESPIRATION RATE: 18 BRPM | DIASTOLIC BLOOD PRESSURE: 60 MMHG

## 2024-09-23 DIAGNOSIS — Z23 ENCOUNTER FOR VACCINATION: ICD-10-CM

## 2024-09-23 DIAGNOSIS — Z79.4 TYPE 2 DIABETES MELLITUS WITH RETINOPATHY AND MACULAR EDEMA, WITH LONG-TERM CURRENT USE OF INSULIN, UNSPECIFIED LATERALITY, UNSPECIFIED RETINOPATHY SEVERITY (H): Primary | ICD-10-CM

## 2024-09-23 DIAGNOSIS — E11.311 TYPE 2 DIABETES MELLITUS WITH RETINOPATHY AND MACULAR EDEMA, WITH LONG-TERM CURRENT USE OF INSULIN, UNSPECIFIED LATERALITY, UNSPECIFIED RETINOPATHY SEVERITY (H): Primary | ICD-10-CM

## 2024-09-23 LAB
CREAT UR-MCNC: 321.7 MG/DL
EST. AVERAGE GLUCOSE BLD GHB EST-MCNC: 269 MG/DL
HBA1C MFR BLD: 11 % (ref 0–5.6)
MICROALBUMIN UR-MCNC: 245.1 MG/L
MICROALBUMIN/CREAT UR: 76.19 MG/G CR (ref 0–25)

## 2024-09-23 PROCEDURE — 90471 IMMUNIZATION ADMIN: CPT | Performed by: NURSE PRACTITIONER

## 2024-09-23 PROCEDURE — 82043 UR ALBUMIN QUANTITATIVE: CPT | Performed by: NURSE PRACTITIONER

## 2024-09-23 PROCEDURE — 90472 IMMUNIZATION ADMIN EACH ADD: CPT | Performed by: NURSE PRACTITIONER

## 2024-09-23 PROCEDURE — 99213 OFFICE O/P EST LOW 20 MIN: CPT | Mod: 25 | Performed by: NURSE PRACTITIONER

## 2024-09-23 PROCEDURE — 36415 COLL VENOUS BLD VENIPUNCTURE: CPT | Performed by: NURSE PRACTITIONER

## 2024-09-23 PROCEDURE — 90673 RIV3 VACCINE NO PRESERV IM: CPT | Performed by: NURSE PRACTITIONER

## 2024-09-23 PROCEDURE — 90715 TDAP VACCINE 7 YRS/> IM: CPT | Performed by: NURSE PRACTITIONER

## 2024-09-23 PROCEDURE — 82570 ASSAY OF URINE CREATININE: CPT | Performed by: NURSE PRACTITIONER

## 2024-09-23 PROCEDURE — 83036 HEMOGLOBIN GLYCOSYLATED A1C: CPT | Performed by: NURSE PRACTITIONER

## 2024-09-23 RX ORDER — INSULIN DEGLUDEC 200 U/ML
INJECTION, SOLUTION SUBCUTANEOUS DAILY
Qty: 15 ML | Status: CANCELLED | OUTPATIENT
Start: 2024-09-23

## 2024-09-23 RX ORDER — METFORMIN HCL 500 MG
1000 TABLET, EXTENDED RELEASE 24 HR ORAL 2 TIMES DAILY WITH MEALS
Qty: 360 TABLET | Refills: 1 | Status: SHIPPED | OUTPATIENT
Start: 2024-09-23

## 2024-09-23 RX ORDER — KETOROLAC TROMETHAMINE 30 MG/ML
1 INJECTION, SOLUTION INTRAMUSCULAR; INTRAVENOUS SEE ADMIN INSTRUCTIONS
COMMUNITY
Start: 2024-05-13

## 2024-09-23 ASSESSMENT — ENCOUNTER SYMPTOMS: DIARRHEA: 1

## 2024-09-23 ASSESSMENT — PAIN SCALES - GENERAL: PAINLEVEL: NO PAIN (0)

## 2024-09-23 NOTE — PATIENT INSTRUCTIONS
Make an eye exam appointment.  I refilled metformin with XR formulation to see if this reduces diarrhea side effects.  Labs today for A1C and urine albumin to check how kidneys are affected by your diabetes.  If A1C is elevated, plan to increase trulicity to 1.5 mg daily and I will check with Rachel on costs and Tresiba and put that in if we feel this would be more cost effective and discontinue twice daily lantus.

## 2024-09-23 NOTE — PROGRESS NOTES
Assessment & Plan     Type 2 diabetes mellitus with retinopathy and macular edema, with long-term current use of insulin, unspecified laterality, unspecified retinopathy severity (H)  Rechecking A1c today.  If elevated will plan on increasing Trulicity to 1.5 mg weekly and I will speak with diabetic education on cost effectiveness of true reading with Tresiba U200 once daily instead of Lantus twice daily at high doses.  I am going to change her metformin to extended release to see if this reduces her diarrhea episodes since they are intermittent, it may be caused from the medication.  Plan to follow-up in 3 to 6 months pending results.  I also added an albumin urine random for evaluation of kidney function with the diabetes as well.  Patient advised to make diabetic eye exam.  - Hemoglobin A1c; Future  - Albumin Random Urine Quantitative with Creat Ratio; Future  - metFORMIN (GLUCOPHAGE XR) 500 MG 24 hr tablet; Take 2 tablets (1,000 mg) by mouth 2 times daily (with meals).    Encounter for vaccination  - TDAP 7+ (ADACEL,BOOSTRIX)  - INFLUENZA VACCINE IM 18-64 YRS (FLUBLOK)    See Patient Instructions    Zi Sadler is a 64 year old, presenting for the following health issues:  Diabetes (Follow up on diabetes.  ) and Diarrhea (Pt also having issues with diarrhea.)      9/23/2024     2:01 PM   Additional Questions   Roomed by Zulema Zambrano         9/23/2024     2:01 PM   Patient Reported Additional Medications   Patient reports taking the following new medications immodium     Diarrhea    History of Present Illness       Diabetes:   She presents for follow up of diabetes.  She is checking home blood glucose a few times a month.   She checks blood glucose after meals.  Blood glucose is sometimes over 200 and never under 70. She is aware of hypoglycemia symptoms including shakiness and blurred vision.    She has no concerns regarding her diabetes at this time.   She is not experiencing numbness or burning in  feet, excessive thirst, blurry vision, weight changes or redness, sores or blisters on feet. The patient has not had a diabetic eye exam in the last 12 months.          She eats 2-3 servings of fruits and vegetables daily.She consumes 0 sweetened beverage(s) daily.She exercises with enough effort to increase her heart rate 30 to 60 minutes per day.  She exercises with enough effort to increase her heart rate 4 days per week. She is missing 1 dose(s) of medications per week.  She is not taking prescribed medications regularly due to other.         Diarrhea  Onset/Duration: 4-5 months  Description:       Consistency of stool: loose, green, explosive, and mucousy       Blood in stool: No       Number of loose stools past 24 hours: 3 with each episode  Progression of Symptoms: intermittent  Accompanying signs and symptoms:       Fever: No       Nausea/Vomiting: No       Abdominal pain: occasionally with diarrhea episode       Weight loss: No       Episodes of constipation: No  History   Ill contacts: No  Recent use of antibiotics: No  Recent travels: No  Recent medication-new or changes(Rx or OTC): No  Precipitating or alleviating factors: None  Therapies tried and outcome: Imodium AD and kaopectate  She does get relief on Imodium right ear and kaopectate.  She has been on Metformin for some time but this could likely be a side effect of her medication.    Has not been checking blood sugars regularly.  Has seen 99 or 200 in the mornings when checking.  Feels that her blood sugars are improving since she has a new job and has been more active.      Review of Systems  CONSTITUTIONAL: NEGATIVE for fever, chills, change in weight  RESP: NEGATIVE for significant cough or SOB  CV: NEGATIVE for chest pain, palpitations or peripheral edema  GI: POSITIVE for diarrhea intermittently, no current abdominal pain  PSYCHIATRIC: NEGATIVE for changes in mood or affect  ROS otherwise negative      Objective    /60   Pulse 73    "Temp 98.4  F (36.9  C) (Tympanic)   Resp 18   Ht 1.6 m (5' 3\")   Wt 91.5 kg (201 lb 11.2 oz)   SpO2 97%   BMI 35.73 kg/m    Body mass index is 35.73 kg/m .  Physical Exam   GENERAL: alert and no distress  RESP: lungs clear to auscultation - no rales, rhonchi or wheezes  CV: regular rate and rhythm, normal S1 S2, no S3 or S4, no murmur, click or rub, no peripheral edema  PSYCH: mentation appears normal, affect normal/bright    Signed Electronically by: Yessi Gray NP    "

## 2024-09-24 ENCOUNTER — TELEPHONE (OUTPATIENT)
Dept: FAMILY MEDICINE | Facility: CLINIC | Age: 64
End: 2024-09-24
Payer: COMMERCIAL

## 2024-09-24 NOTE — TELEPHONE ENCOUNTER
Patient appears to be new to [place]. Does not look like this has been discussed with Yessi Gray who she has seen. Would like to verify this with patient and possibly advise for appointment to discuss with provider.     Bea SANTANA RN  [facility]  [phone]

## 2024-09-24 NOTE — TELEPHONE ENCOUNTER
Forms/Letter Request    Type of form/letter: OTHER: Work restriction     Pt needs work restriction notes, can't lift up anything over 35lb due to previous back surgeries and would like to avoid any potential future back injury at work.     Employer is requesting for one. 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 Provider needs to generate    Where did/will the form come from? N/A    When is form/letter needed by: ASAP    How would you like the form/letter returned:     Patient Notified form requests are processed in 5-7 business days:Yes    Okay to leave a detailed message?: Yes at Cell number on file:    Telephone Information:   Mobile [phone]

## 2024-09-24 NOTE — TELEPHONE ENCOUNTER
Patient called back. Patient did not discuss this with Malou Gray yesterday at the visit. Patient stated that she had 2 surgeries on her back years ago. The job that she is at now required frequent lifting and patient would like to avoid injuring herself further. Patient started that she needs a note saying she cannot lift over 35 lbs.     RN explained that this does need to be a discussion with the provider. Since provider just saw patient yesterday, RN is unsure if provider is going to require an in person visit again or if this can be a virtual visit to talk about restrictions.   Or if provider would be willing to give her a note without seeing her again.     Provider please advise.     Patient is okay with a detailed VM.   Aide Braxton RN on 9/24/2024 at 4:21 PM

## 2024-09-25 NOTE — TELEPHONE ENCOUNTER
Yessi,    Please see TE re: request for letter.  Pt had OV 9/23.  Would you like the visit to be in-person or would virtual be sufficient?    Charo Parisi RN

## 2024-09-26 NOTE — TELEPHONE ENCOUNTER
Voicemail left for patient to schedule virtual visit. Advised she can return call to [facility] main line at [phone] to speak with a , as it doesn't appear she has an active MyChart.    Deann Coleman RN  [facility]       Cartilage Graft Text: The defect edges were debeveled with a #15 scalpel blade.  Given the location of the defect, shape of the defect, the fact the defect involved a full thickness cartilage defect a cartilage graft was deemed most appropriate.  An appropriate donor site was identified, cleansed, and anesthetized. The cartilage graft was then harvested and transferred to the recipient site, oriented appropriately and then sutured into place.  The secondary defect was then repaired using a primary closure.

## 2024-10-26 DIAGNOSIS — E11.311 TYPE 2 DIABETES MELLITUS WITH RETINOPATHY AND MACULAR EDEMA, WITH LONG-TERM CURRENT USE OF INSULIN, UNSPECIFIED LATERALITY, UNSPECIFIED RETINOPATHY SEVERITY (H): ICD-10-CM

## 2024-10-26 DIAGNOSIS — Z79.4 TYPE 2 DIABETES MELLITUS WITH RETINOPATHY AND MACULAR EDEMA, WITH LONG-TERM CURRENT USE OF INSULIN, UNSPECIFIED LATERALITY, UNSPECIFIED RETINOPATHY SEVERITY (H): ICD-10-CM

## 2024-10-28 NOTE — TELEPHONE ENCOUNTER
Requested Prescriptions   Pending Prescriptions Disp Refills    LANTUS SOLOSTAR 100 UNIT/ML soln [Pharmacy Med Name: Lantus SoloStar 100 UNIT/ML Subcutaneous Solution Pen-injector] 144 mL 0     Sig: INJECT 80 UNITS SUBCUTANEOUSLY TWICE DAILY       Insulin Protocol Failed - 10/28/2024  3:48 PM        Failed - Chart review required     Review Chart.    Do not approve if insulin is used in a pump.  Instead, direct refill request to the patient's endocrinologist.  If the patient doesn't have an endocrinologist, then send the refill to the patient's PCP for review            Passed - HgbA1C in past 3 or 6 months     If HgbA1C is 8 or greater, it needs to be on file within the past 3 months.  If less than 8, must be on file within the past 6 months.     Recent Labs   Lab Test 09/23/24  1453   A1C 11.0*             Passed - Medication is active on med list        Passed - Has GFR on file in past 12 months and most recent value is normal        Passed - Recent (6 mo) or future (90 days) visit within the authorizing provider's specialty     The patient must have completed an in-person or virtual visit within the past 6 months or has a future visit scheduled within the next 90 days with the authorizing provider s specialty.  Urgent care and e-visits do not quality as an office visit for this protocol.          Passed - Medication indicated for associated diagnosis     Medication is associated with one or more of the following diagnoses:   - Type 1 diabetes mellitus  - Type 2 diabetes mellitus  - Diabetic nephropathy; Prophylaxis  - Neuropathy due to diabetes mellitus; Prophylaxis  - Retinopathy due to diabetes mellitus; Prophylaxis  - Diabetes mellitus associated with cystic fibrosis  - Disorder of cardiovascular system; Prophylaxis - Type 1 diabetes mellitus   - Disorder of cardiovascular system; Prophylaxis - Type 2 diabetes mellitus            Passed - Patient is 18 years of age or older

## 2024-10-29 RX ORDER — INSULIN GLARGINE 100 [IU]/ML
INJECTION, SOLUTION SUBCUTANEOUS
Qty: 144 ML | Refills: 0 | Status: SHIPPED | OUTPATIENT
Start: 2024-10-29

## 2024-12-05 ENCOUNTER — TELEPHONE (OUTPATIENT)
Dept: FAMILY MEDICINE | Facility: CLINIC | Age: 64
End: 2024-12-05
Payer: COMMERCIAL

## 2024-12-10 ENCOUNTER — TELEPHONE (OUTPATIENT)
Dept: FAMILY MEDICINE | Facility: CLINIC | Age: 64
End: 2024-12-10
Payer: COMMERCIAL

## 2024-12-10 NOTE — TELEPHONE ENCOUNTER
Patient Quality Outreach    Patient is due for the following:   Cervical Cancer Screening - PAP Needed  Physical Preventive Adult Physical    Action(s) Taken:   Pt to schedule    Type of outreach:    Sent letter.    Questions for provider review:    None           Daniel Guallpa

## 2024-12-10 NOTE — LETTER
December 10, 2024    To  Viktoriya Land  [address]   [place] [address]    Your team at [facility] cares about your health. We have reviewed your chart and based on our findings; we are making the following recommendations to better manage your health.     You are in particular need of attention regarding the following:     Schedule a primary care office visit with your provider for a Pap Smear to screen for Cervical Cancer.  PREVENTATIVE VISIT: Physical    If you have already completed these items, please contact the clinic via phone or   [facility] so your care team can review and update your records. Thank you for   choosing [facility] for your healthcare needs. For any questions,   concerns, or to schedule an appointment please contact our clinic.    Healthy Regards,      Your [facility] Care Team

## 2025-01-02 ENCOUNTER — MYC MEDICAL ADVICE (OUTPATIENT)
Dept: FAMILY MEDICINE | Facility: CLINIC | Age: 65
End: 2025-01-02
Payer: COMMERCIAL

## 2025-01-02 NOTE — TELEPHONE ENCOUNTER
Patient Quality Outreach    Patient is due for the following:   Diabetes -  Diabetic Follow-Up Visit    Action(s) Taken:   Schedule a office visit for diabetes recheck    Type of outreach:    Please call patient to get her scheduled for diabetes recheck.    Questions for provider review:    None     Yessi Gray NP

## 2025-01-02 NOTE — TELEPHONE ENCOUNTER
Patient Quality Outreach    Patient is due for the following:   Diabetes -  Diabetic Follow-Up Visit    Action(s) Taken:   Schedule a office visit for diabetes    Type of outreach:    Phone, spoke to patient/parent. Declined to schedule at this time     Questions for provider review:    Declined to schedule at this time            Diomedes Cadena, CMA

## 2025-02-07 ENCOUNTER — TELEPHONE (OUTPATIENT)
Dept: FAMILY MEDICINE | Facility: CLINIC | Age: 65
End: 2025-02-07
Payer: COMMERCIAL

## 2025-02-07 DIAGNOSIS — E11.311 TYPE 2 DIABETES MELLITUS WITH RETINOPATHY AND MACULAR EDEMA, WITH LONG-TERM CURRENT USE OF INSULIN, UNSPECIFIED LATERALITY, UNSPECIFIED RETINOPATHY SEVERITY (H): ICD-10-CM

## 2025-02-07 DIAGNOSIS — Z79.4 TYPE 2 DIABETES MELLITUS WITH RETINOPATHY AND MACULAR EDEMA, WITH LONG-TERM CURRENT USE OF INSULIN, UNSPECIFIED LATERALITY, UNSPECIFIED RETINOPATHY SEVERITY (H): ICD-10-CM

## 2025-02-07 NOTE — TELEPHONE ENCOUNTER
General Call    Contacts       Contact Date/Time Type Contact Phone/Fax    02/07/2025 04:22 PM CST Phone (Incoming) Viktoriya Land (Self) [phone] (M)          Reason for Call:  med refill submitted for     LANTUS SOLOSTAR 100 UNIT/ML soln       What are your questions or concerns:  pt is completely out and needs it done before we close today    Okay to leave a detailed message?: Yes at Cell number on file:    Telephone Information:   Mobile [phone]

## 2025-02-07 NOTE — TELEPHONE ENCOUNTER
Requested Prescriptions   Pending Prescriptions Disp Refills    LANTUS SOLOSTAR 100 UNIT/ML soln 144 mL 0       Insulin Protocol Failed - 2/7/2025  2:44 PM        Failed - HgbA1C in past 3 or 6 months     If HgbA1C is 8 or greater, it needs to be on file within the past 3 months.  If less than 8, must be on file within the past 6 months.     Recent Labs   Lab Test 09/23/24  1453   A1C 11.0*             Failed - Chart review required     Review Chart.    Do not approve if insulin is used in a pump.  Instead, direct refill request to the patient's endocrinologist.  If the patient doesn't have an endocrinologist, then send the refill to the patient's PCP for review            Passed - Medication is active on med list        Passed - Recent (6 mo) or future (90 days) visit within the authorizing provider's specialty     The patient must have completed an in-person or virtual visit within the past 6 months or has a future visit scheduled within the next 90 days with the authorizing provider s specialty.  Urgent care and e-visits do not quality as an office visit for this protocol.          Passed - Medication indicated for associated diagnosis     Medication is associated with one or more of the following diagnoses:   - Type 1 diabetes mellitus  - Type 2 diabetes mellitus  - Diabetic nephropathy; Prophylaxis  - Neuropathy due to diabetes mellitus; Prophylaxis  - Retinopathy due to diabetes mellitus; Prophylaxis  - Diabetes mellitus associated with cystic fibrosis  - Disorder of cardiovascular system; Prophylaxis - Type 1 diabetes mellitus   - Disorder of cardiovascular system; Prophylaxis - Type 2 diabetes mellitus            Passed - Patient is 18 years of age or older

## 2025-02-10 ENCOUNTER — MEDICAL CORRESPONDENCE (OUTPATIENT)
Dept: FAMILY MEDICINE | Facility: CLINIC | Age: 65
End: 2025-02-10
Payer: COMMERCIAL

## 2025-02-10 RX ORDER — INSULIN GLARGINE 100 [IU]/ML
80 INJECTION, SOLUTION SUBCUTANEOUS 2 TIMES DAILY
Qty: 144 ML | Refills: 0 | Status: SHIPPED | OUTPATIENT
Start: 2025-02-10 | End: 2025-05-11

## 2025-02-10 NOTE — PROGRESS NOTES
Called patient since last discussion was in 9/24 and she was going to work on Tresiba U200 and today I received refill on Lantus.  Patient states that she is back to the Lantus 80 units twice daily.  She has not seen diabetic education in a couple months.  She has a follow-up with me on 2/21/25 but I discussed that her diabetes is not controlled on high doses of insulin, trulicity and metformin and I did refer her to endocrinology which she has not followed up with making an appointment.  I gave her the number to call and make an appointment with endocrinology due to elevated A1C despite GLP-1, metformin and high dose insulin as treatment.  Yessi Gray NP on 2/10/2025 at 8:54 AM

## 2025-02-24 DIAGNOSIS — Z79.4 UNCONTROLLED DIABETES MELLITUS WITH HYPERGLYCEMIA, WITH LONG-TERM CURRENT USE OF INSULIN (H): Primary | ICD-10-CM

## 2025-02-24 DIAGNOSIS — E11.65 UNCONTROLLED DIABETES MELLITUS WITH HYPERGLYCEMIA, WITH LONG-TERM CURRENT USE OF INSULIN (H): Primary | ICD-10-CM

## 2025-02-24 DIAGNOSIS — R79.89 ELEVATED TSH: ICD-10-CM

## 2025-02-24 DIAGNOSIS — Z79.4 UNCONTROLLED DIABETES MELLITUS WITH HYPERGLYCEMIA, WITH LONG-TERM CURRENT USE OF INSULIN (H): ICD-10-CM

## 2025-02-24 DIAGNOSIS — E11.65 UNCONTROLLED DIABETES MELLITUS WITH HYPERGLYCEMIA, WITH LONG-TERM CURRENT USE OF INSULIN (H): ICD-10-CM

## 2025-02-24 DIAGNOSIS — E03.8 SUBCLINICAL HYPOTHYROIDISM: ICD-10-CM

## 2025-02-24 RX ORDER — TIRZEPATIDE 2.5 MG/.5ML
INJECTION, SOLUTION SUBCUTANEOUS
Qty: 4 ML | Refills: 0 | OUTPATIENT
Start: 2025-02-24

## 2025-02-24 RX ORDER — TIRZEPATIDE 2.5 MG/.5ML
2.5 INJECTION, SOLUTION SUBCUTANEOUS
Qty: 2 ML | Refills: 0 | Status: SHIPPED | OUTPATIENT
Start: 2025-02-24 | End: 2025-02-24

## 2025-02-24 NOTE — TELEPHONE ENCOUNTER
Please notify patient that her insurance prefers trulicity.  I have reordered this at the 0.75 mg once weekly for 4 weeks then increasing to 1.5 mg weekly.  Yessi Gray NP on 2/24/2025 at 2:37 PM

## 2025-04-01 ENCOUNTER — TELEPHONE (OUTPATIENT)
Dept: ENDOCRINOLOGY | Facility: CLINIC | Age: 65
End: 2025-04-01
Payer: COMMERCIAL

## 2025-04-01 NOTE — TELEPHONE ENCOUNTER
Patient declined to reschedule appointment:  Visit type: NEW DIABETES  Provider: Yaniv  Location: [facility] DIABETES  Testing/imaging: N/A   Additional notes: Spoke with pt, offered appts at [place]/by video. Pt prefers [place] location, caller informed her that there are currently no endocrinologists at that clinic. Pt prefers to keep appt in May in [place] but thanked caller for the offer. Updated waitlist to include all [facility] providers.    Flora Gonzalez on 4/1/2025 at 11:46 AM

## 2025-04-14 ENCOUNTER — TELEPHONE (OUTPATIENT)
Dept: FAMILY MEDICINE | Facility: CLINIC | Age: 65
End: 2025-04-14
Payer: COMMERCIAL

## 2025-04-14 DIAGNOSIS — Z79.4 UNCONTROLLED DIABETES MELLITUS WITH HYPERGLYCEMIA, WITH LONG-TERM CURRENT USE OF INSULIN (H): Primary | ICD-10-CM

## 2025-04-14 DIAGNOSIS — E11.65 UNCONTROLLED DIABETES MELLITUS WITH HYPERGLYCEMIA, WITH LONG-TERM CURRENT USE OF INSULIN (H): Primary | ICD-10-CM

## 2025-04-14 NOTE — TELEPHONE ENCOUNTER
Prior Authorization Retail Medication Request    Medication/Dose: Lantus solostar  Diagnosis and ICD code (if different than what is on RX):    New/renewal/insurance change PA/secondary ins. PA:  Previously Tried and Failed:  trulicity; levemir; metformin; mounjaro; victoza  Rationale:  patient has used since 5/2024    Insurance   Primary: not provided  Insurance ID:  not provided  CM Key: [id number]    Secondary (if applicable):  Insurance ID:      Pharmacy Information (if different than what is on RX)  Name:    Phone:    Fax:    Clinic Information  Preferred routing pool for dept communication: [phone] [id number]

## 2025-04-16 ENCOUNTER — TELEPHONE (OUTPATIENT)
Dept: FAMILY MEDICINE | Facility: CLINIC | Age: 65
End: 2025-04-16
Payer: COMMERCIAL

## 2025-04-16 DIAGNOSIS — Z79.4 TYPE 2 DIABETES MELLITUS WITH RETINOPATHY AND MACULAR EDEMA, WITH LONG-TERM CURRENT USE OF INSULIN, UNSPECIFIED LATERALITY, UNSPECIFIED RETINOPATHY SEVERITY (H): ICD-10-CM

## 2025-04-16 DIAGNOSIS — E11.311 TYPE 2 DIABETES MELLITUS WITH RETINOPATHY AND MACULAR EDEMA, WITH LONG-TERM CURRENT USE OF INSULIN, UNSPECIFIED LATERALITY, UNSPECIFIED RETINOPATHY SEVERITY (H): ICD-10-CM

## 2025-04-16 RX ORDER — INSULIN GLARGINE 100 [IU]/ML
80 INJECTION, SOLUTION SUBCUTANEOUS 2 TIMES DAILY
Qty: 144 ML | Refills: 0 | Status: SHIPPED | OUTPATIENT
Start: 2025-04-16

## 2025-04-16 NOTE — TELEPHONE ENCOUNTER
"\"[facility] is out of Lantus and I have been out of my insulin for 7 days and they can't get it in in a timely manner. What can I do?..\"    I asked if she had called other pharmacies and she has not. She requested order be resent to [facility] pharmacy which I did.     María Quezada RN    "

## 2025-04-16 NOTE — TELEPHONE ENCOUNTER
PA Initiation    Medication: LANTUS SOLOSTAR   UNIT/ML SC SOPN  Insurance Company: [employer] Non-Specialty PA's - Phone [phone] Fax [phone]  Pharmacy Filling the Rx: [facility] [place], [place] - [facility]  Filling Pharmacy Phone: [phone]  Filling Pharmacy Fax:    Start Date: 4/16/2025  Retail Pharmacy Prior Authorization Team   Phone: [phone]

## 2025-04-16 NOTE — TELEPHONE ENCOUNTER
Pt called to check the status of this. She has been out of her medication for 7 days and needs a resolution ASAP.    Erin  on 4/16/2025 at 12:38 PM

## 2025-04-17 NOTE — TELEPHONE ENCOUNTER
PRIOR AUTHORIZATION DENIED    Medication: LANTUS SOLOSTAR   UNIT/ML SC SOPN  Insurance Company: [employer] Non-Specialty PA's - Phone [phone] Fax [phone]  Denial Date: 4/16/2025  Denial Reason(s):     Appeal Information:     Patient Notified: The clinic should notify the patient of the denial and discuss possible change in medication.

## 2025-04-17 NOTE — TELEPHONE ENCOUNTER
Refilled with basaglar to see if this is covered.  I know that she has high doses but is non compliant as well.  See if she started GLP-1 again and have her follow-up with me in mid May if not already scheduled.  Yessi Gray NP on 4/17/2025 at 10:26 AM

## 2025-05-05 ENCOUNTER — TRANSFERRED RECORDS (OUTPATIENT)
Dept: HEALTH INFORMATION MANAGEMENT | Facility: CLINIC | Age: 65
End: 2025-05-05
Payer: COMMERCIAL

## 2025-05-09 NOTE — PROGRESS NOTES
"Outcome for 05/09/25 11:24 AM: Left Voicemail   05/09/25 11:24 AM : Appointment reminder phone call made to patient. No Answer. LVM Advising pt to arrive 15 mins early and and to bring glucometer    HPI:     Viktoriya is a 63 yo woman here in consultation for her diabetes, diagnosed around 2012, complicated by nephropathy, retinopathy and peripheral neuropathy.  She was referred by her PCP, Yessi Gray.  Viktoriya also has a history of hypertension, obesity, and the problems listed below.     Viktoriya reports that she was diagnosed with diabetes on a physical. She does not recall having symptoms, and she was not in DKA.   A1c at diagnosis was 13.8%.  She was followed in the [facility] system until transferring primary care to [facility] a couple of years ago.      Having issues with insurance coverage for insulins and other meds.       Current treatment:   Trulicity- 1.5 mg weekly  Metformin- since diagnosis.   Glargine- 80 units BID- misses it once a week. Evening dose is harder to remember- falls asleep about once a week.     Previous treatment:   Mounjaro- no longer covered by insurance- stopped in 2024.   Glimepiride  Never has been on short acting insulin or SGLT-2 inhibitor.     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   doesn't drive.  Son lives with her.     High blood pressure. On Zestoretic and beta blocker.  At home 140s systolic.    Recent glucose: One glucose reading of 117 mg/dL in the past week.  Previously wore a sensor, but \"broke them\" and they were expensive.                Diabetes Control:   Lab Results   Component Value Date    A1C 11.1 02/21/2025    A1C 11.0 09/23/2024    A1C 11.6 05/13/2024     Diabetes Complications: +retinopathy with macular edema, +peripheral neuropathy, +nephropathy, microalbuminuria  History of DKA: no  Able to Detect Hypoglycemia: yes  Usual Diabetes Care Provider: Yessi Gray NP    No past medical history on file.    Past Surgical History:   Procedure " Laterality Date    COLONOSCOPY N/A 5/23/2018    Procedure: COLONOSCOPY;  colonoscopy;  Surgeon: Federico Lovett MD;  Location: [facility]       No family history on file.  Mom- diabetes, kidney failure, MI  Dad- skin cancer, lived to 93  Sister- diabetes, MI in 60s.   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  Lives with  and son.   Socioeconomic History    Marital status:    Tobacco Use    Smoking status: Former     Types: Cigarettes    Smokeless tobacco: Never   Vaping Use    Vaping status: Never Used     Social Drivers of Health     Financial Resource Strain: Low Risk  (5/13/2024)    Financial Resource Strain     Within the past 12 months, have you or your family members you live with been unable to get utilities (heat, electricity) when it was really needed?: No   Food Insecurity: Low Risk  (5/13/2024)    Food Insecurity     Within the past 12 months, did you worry that your food would run out before you got money to buy more?: No     Within the past 12 months, did the food you bought just not last and you didn t have money to get more?: No   Transportation Needs: Low Risk  (5/13/2024)    Transportation Needs     Within the past 12 months, has lack of transportation kept you from medical appointments, getting your medicines, non-medical meetings or appointments, work, or from getting things that you need?: No   Physical Activity: Insufficiently Active (5/13/2024)    Exercise Vital Sign     Days of Exercise per Week: 2 days     Minutes of Exercise per Session: 10 min   Stress: Stress Concern Present (5/13/2024)    [place] [employer] - Occupational Stress Questionnaire     Feeling of Stress : To some extent   Social Connections: Unknown (5/13/2024)    Social Connection and Isolation Panel [NHANES]     Frequency of Social Gatherings with Friends and Family: Never   Interpersonal Safety: Low Risk   (2/21/2025)    Interpersonal Safety     Do you feel physically and emotionally safe where you currently live?: Yes     Within the past 12 months, have you been hit, slapped, kicked or otherwise physically hurt by someone?: No     Within the past 12 months, have you been humiliated or emotionally abused in other ways by your partner or ex-partner?: No   Housing Stability: Low Risk  (5/13/2024)    Housing Stability     Do you have housing? : Yes     Are you worried about losing your housing?: No       Current Outpatient Medications   Medication Sig Dispense Refill    albuterol (PROAIR HFA/PROVENTIL HFA/VENTOLIN HFA) 108 (90 Base) MCG/ACT inhaler Inhale 1-2 puffs into the lungs every 4 hours as needed for shortness of breath, wheezing or cough. 18 g 1    atorvastatin (LIPITOR) 40 MG tablet Take 1 tablet (40 mg) by mouth daily 90 tablet 3    blood glucose (ACCU-CHEK GUIDE) test strip Use to test blood sugar 3 times daily 100 strip 11    blood glucose monitoring (SOFTCLIX) lancets USE 1 TO CHECK GLUCOSE ONCE DAILY      Blood Glucose Monitoring Suppl (ACCU-CHEK GUIDE ME) w/Device KIT daily      dulaglutide (TRULICITY) 1.5 MG/0.5ML pen Inject 1.5 mg subcutaneously every 7 days. 6 mL 0    insulin glargine (BASAGLAR PEN) 100 UNIT/ML pen Inject 80 Units subcutaneously at bedtime. 75 mL 0    LANTUS SOLOSTAR 100 UNIT/ML soln Inject 80 Units subcutaneously 2 times daily. 144 mL 0    lisinopril-hydrochlorothiazide (ZESTORETIC) 20-12.5 MG tablet Take 2 tablets by mouth once daily 180 tablet 1    metFORMIN (GLUCOPHAGE XR) 500 MG 24 hr tablet Take 2 tablets (1,000 mg) by mouth 2 times daily (with meals). 360 tablet 1    metoprolol succinate ER (TOPROL XL) 50 MG 24 hr tablet Take 1 tablet by mouth once daily 90 tablet 1    TRULICITY 0.75 MG/0.5ML pen INJECT 0.75 MG SUBCUTANEOUSLY ONCE A WEEK 4 mL 0     No current facility-administered medications for this visit.          Allergies   Allergen Reactions    Penicillins Hives and Rash  "    Hives on face       Physical Exam  BP (!) 160/73 (BP Location: Right arm, Patient Position: Sitting, Cuff Size: Adult Small)   Pulse 62   Ht 1.6 m (5' 3\")   Wt 90.3 kg (199 lb)   SpO2 96%   BMI 35.25 kg/m    GENERAL:  Alert and oriented X3, NAD, well dressed, answering questions appropriately, appears stated age.  HEENT: OP clear, no lymphadenopathy, no thyromegaly, non-tender, no exophthalmus, no proptosis, EOMI, no lid lag, no retraction  ABDOMEN: soft, nontender, nondistended, significant central obesity with +dark stretch marks.  EXTREMITIES: thin extremities. +pulses, no rashes, no lesions  Diabetic foot exam: normal DP and PT pulses, no trophic changes or ulcerative lesions, and normal sensory exam.  +onychomycosis.       RESULTS  Lab Results   Component Value Date    A1C 11.1 (H) 02/21/2025    A1C 11.0 (H) 09/23/2024    A1C 11.6 (H) 05/13/2024       TSH   Date Value Ref Range Status   02/21/2025 4.30 (H) 0.30 - 4.20 uIU/mL Final     Free T4   Date Value Ref Range Status   02/21/2025 1.33 0.90 - 1.70 ng/dL Final       No results found for: \"ALT\"]    Recent Labs   Lab Test 05/13/24  1438   CHOL 87   HDL 30*   LDL 5   TRIG 259*       Lab Results   Component Value Date     05/13/2024      Lab Results   Component Value Date    POTASSIUM 4.5 05/13/2024     Lab Results   Component Value Date    CHLORIDE 100 05/13/2024     Lab Results   Component Value Date    KELLY 9.8 05/13/2024     Lab Results   Component Value Date    CO2 29 05/13/2024     Lab Results   Component Value Date    BUN 16.4 05/13/2024     Lab Results   Component Value Date    CR 0.94 05/13/2024       GFR Estimate   Date Value Ref Range Status   05/13/2024 68 >60 mL/min/1.73m2 Final     No results found for: \"GFRESTBLACK\"    Lab Results   Component Value Date    MICROL 136.6 02/21/2025     No results found for: \"MICROALBUMIN\"  No results found for: \"CPEPT\", \"GADAB\", \"ISCAB\"    No results found for: \"B12\"]    No results found for: \"TTG\", " "\"TTGG\"      Most recent eye exam date: : Not Found     Computed FIB-4 Calculation unavailable. One or more values for this score either were not found within the given timeframe or did not fit some other criterion.  A value of FIB-4 below 1.30 is considered as low risk for advanced fibrosis; a value of FIB-4 over 2.67 is considered as high risk for advanced fibrosis; and FIB-4 values between 1.30 and 2.67 are considered as intermediate risk of advanced fibrosis.    No results found for: \"GADAB\"  No results found for: \"CPEPT\"        Assessment/Plan:     1.  Poorly controlled diabetes, based on A1c of 11.3 today. She has high insulin requirements.  Will check antibodies, c-peptide, glucose, 8am cortisol, 24 hour urine for cortisol to rule out Cushing's. She is on very high doses of lantus and no short acting insulin.  I would like her to start mealtime insulin (humalog U200) and gradually lower lantus (and likely switch to tresiba U200), but I have no glucose data to review today.  Will start dexcom sensor today and review data in a couple of weeks with diabetes educator.  Will try to get coverage for Mounjaro and switch from trulicity for better glucose control/weight loss.  She would benefit from SGLT-2 inhibitor, so I did place a test claim for jardiance.  Will review all of this with [facility] (cost/coverage) and insulin titration with DM education.      Start a glucose sensor.     Meet with [facility] to review your treatment plan/cost.     Start Mounjaro 5 mg weekly.  Stop Trulicity when you start this.      Please let me know if you are having low blood sugars less than 70 or over 250 mg/dL.      If you have concerns, please send me a [facility] message M-Th, call the clinic at [phone], or call [phone] after hours/weekends and ask to speak with the endocrinologist on call.      2.  Risk factors-     Retinopathy:  Yes. Had eye exam within last year. Will be seeing a retina specialist.  Nephropathy:  " Yes. BP has been high.  She does have microalbuminuria.  Would benefit from SGLT-2 inhibitor for kidney protection.  Again, will look into cost/coverage.  GFR 68.   Neuropathy: Yes, currently mild.   Feet: OK, no ulcers.   Lipids:  LDL at target.  5! Patient taking statin  Thyroid screening: TSH slightly elevated at last check.  Will recheck and add antibodies.     3.  F/U in 4 mos with me, sooner with concerns.     63 minutes spent on the date of the encounter doing chart review, review of test results, patient visit and documentation, counseling/coordination of care, and discussion of follow up plan for worsening hyper and hypoglycemia.   The patient understood and is satisfied with today's visit.        Emerald Purvis PA-C, MPAS   [facility]  Department of Medicine  Division of Endocrinology and Diabetes

## 2025-05-12 ENCOUNTER — LAB (OUTPATIENT)
Dept: LAB | Facility: CLINIC | Age: 65
End: 2025-05-12
Payer: COMMERCIAL

## 2025-05-12 ENCOUNTER — OFFICE VISIT (OUTPATIENT)
Dept: ENDOCRINOLOGY | Facility: CLINIC | Age: 65
End: 2025-05-12
Payer: COMMERCIAL

## 2025-05-12 ENCOUNTER — TELEPHONE (OUTPATIENT)
Dept: ENDOCRINOLOGY | Facility: CLINIC | Age: 65
End: 2025-05-12

## 2025-05-12 VITALS
BODY MASS INDEX: 35.26 KG/M2 | SYSTOLIC BLOOD PRESSURE: 160 MMHG | HEART RATE: 62 BPM | WEIGHT: 199 LBS | DIASTOLIC BLOOD PRESSURE: 73 MMHG | HEIGHT: 63 IN | OXYGEN SATURATION: 96 %

## 2025-05-12 DIAGNOSIS — E11.311 TYPE 2 DIABETES MELLITUS WITH RETINOPATHY AND MACULAR EDEMA, WITH LONG-TERM CURRENT USE OF INSULIN, UNSPECIFIED LATERALITY, UNSPECIFIED RETINOPATHY SEVERITY (H): ICD-10-CM

## 2025-05-12 DIAGNOSIS — Z79.4 TYPE 2 DIABETES MELLITUS WITH RETINOPATHY AND MACULAR EDEMA, WITH LONG-TERM CURRENT USE OF INSULIN, UNSPECIFIED LATERALITY, UNSPECIFIED RETINOPATHY SEVERITY (H): ICD-10-CM

## 2025-05-12 LAB
CORTIS SERPL-MCNC: 7.4 UG/DL
EST. AVERAGE GLUCOSE BLD GHB EST-MCNC: 278 MG/DL
FASTING STATUS PATIENT QL REPORTED: NO
GLUCOSE SERPL-MCNC: 182 MG/DL (ref 70–99)
HBA1C MFR BLD: 11.3 %
TSH SERPL DL<=0.005 MIU/L-ACNC: 3.44 UIU/ML (ref 0.3–4.2)
VIT B12 SERPL-MCNC: 499 PG/ML (ref 232–1245)

## 2025-05-12 PROCEDURE — 99000 SPECIMEN HANDLING OFFICE-LAB: CPT | Performed by: PATHOLOGY

## 2025-05-12 PROCEDURE — 1126F AMNT PAIN NOTED NONE PRSNT: CPT | Performed by: PHYSICIAN ASSISTANT

## 2025-05-12 PROCEDURE — 84443 ASSAY THYROID STIM HORMONE: CPT | Performed by: PATHOLOGY

## 2025-05-12 PROCEDURE — 82607 VITAMIN B-12: CPT | Performed by: PHYSICIAN ASSISTANT

## 2025-05-12 PROCEDURE — 3077F SYST BP >= 140 MM HG: CPT | Performed by: PHYSICIAN ASSISTANT

## 2025-05-12 PROCEDURE — 84681 ASSAY OF C-PEPTIDE: CPT | Performed by: PHYSICIAN ASSISTANT

## 2025-05-12 PROCEDURE — 83036 HEMOGLOBIN GLYCOSYLATED A1C: CPT | Performed by: PATHOLOGY

## 2025-05-12 PROCEDURE — 86341 ISLET CELL ANTIBODY: CPT | Mod: 90 | Performed by: PATHOLOGY

## 2025-05-12 PROCEDURE — 36415 COLL VENOUS BLD VENIPUNCTURE: CPT | Performed by: PATHOLOGY

## 2025-05-12 PROCEDURE — 82947 ASSAY GLUCOSE BLOOD QUANT: CPT | Performed by: PATHOLOGY

## 2025-05-12 PROCEDURE — 3078F DIAST BP <80 MM HG: CPT | Performed by: PHYSICIAN ASSISTANT

## 2025-05-12 PROCEDURE — 82533 TOTAL CORTISOL: CPT | Performed by: PHYSICIAN ASSISTANT

## 2025-05-12 PROCEDURE — 86376 MICROSOMAL ANTIBODY EACH: CPT | Performed by: PHYSICIAN ASSISTANT

## 2025-05-12 PROCEDURE — 99205 OFFICE O/P NEW HI 60 MIN: CPT | Performed by: PHYSICIAN ASSISTANT

## 2025-05-12 RX ORDER — ACYCLOVIR 400 MG/1
TABLET ORAL
Qty: 3 EACH | Refills: 5 | Status: SHIPPED | OUTPATIENT
Start: 2025-05-12

## 2025-05-12 RX ORDER — INSULIN GLARGINE-YFGN 100 [IU]/ML
INJECTION, SOLUTION SUBCUTANEOUS
COMMUNITY
Start: 2025-04-28

## 2025-05-12 ASSESSMENT — PAIN SCALES - GENERAL: PAINLEVEL_OUTOF10: NO PAIN (0)

## 2025-05-12 NOTE — PATIENT INSTRUCTIONS
Start a glucose sensor.     Meet with [facility] pharmacy to review your treatment plan/cost.     Start Mounjaro 5 mg weekly.  Stop Trulicity when you start this.      Please let me know if you are having low blood sugars less than 70 or over 250 mg/dL.      If you have concerns, please send me a [facility] message M-Th, call the clinic at [phone], or call [phone] after hours/weekends and ask to speak with the endocrinologist on call.

## 2025-05-12 NOTE — NURSING NOTE
"Chief Complaint   Patient presents with    Diabetes     Vital signs:      BP: (!) 160/73 Pulse: 62     SpO2: 96 %     Height: 160 cm (5' 3\") Weight: 90.3 kg (199 lb)  Estimated body mass index is 35.25 kg/m  as calculated from the following:    Height as of this encounter: 1.6 m (5' 3\").    Weight as of this encounter: 90.3 kg (199 lb).        "

## 2025-05-12 NOTE — TELEPHONE ENCOUNTER
PA Initiation    Medication: MOUNJARO 5 MG/0.5ML SC SOAJ  [facility] - Phone [phone] Fax [phone]  Pharmacy Filling the Rx:    Filling Pharmacy Phone:    Filling Pharmacy Fax:    Start Date: 5/12/2025    Key: [id number]

## 2025-05-12 NOTE — LETTER
"5/12/2025       RE: Viktoriya Land  [address]  [place] [address]     Dear Colleague,    Thank you for referring your patient, Viktoriya Land, to the [facility] [place] [facility]. Please see a copy of my visit note below.    Outcome for 05/09/25 11:24 AM: Left Voicemail   05/09/25 11:24 AM : Appointment reminder phone call made to patient. No Answer. LVM Advising pt to arrive 15 mins early and and to bring glucometer    HPI:     Viktoriya is a 65 yo woman here in consultation for her diabetes, diagnosed around 2012, complicated by nephropathy, retinopathy and peripheral neuropathy.  She was referred by her PCP, Yessi Gray.  Viktoriya also has a history of hypertension, obesity, and the problems listed below.     Viktoriya reports that she was diagnosed with diabetes on a physical. She does not recall having symptoms, and she was not in DKA.   A1c at diagnosis was 13.8%.  She was followed in the [facility] system until transferring primary care to [place] a couple of years ago.      Having issues with insurance coverage for insulins and other meds.       Current treatment:   Trulicity- 1.5 mg weekly  Metformin- since diagnosis.   Glargine- 80 units BID- misses it once a week. Evening dose is harder to remember- falls asleep about once a week.     Previous treatment:   Mounjaro- no longer covered by insurance- stopped in 2024.   Glimepiride  Never has been on short acting insulin or SGLT-2 inhibitor.     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   doesn't drive.  Son lives with her.     High blood pressure. On Zestoretic and beta blocker.  At home 140s systolic.    Recent glucose: One glucose reading of 117 mg/dL in the past week.  Previously wore a sensor, but \"broke them\" and they were expensive.                Diabetes Control:   Lab Results   Component Value Date    A1C 11.1 02/21/2025    A1C 11.0 09/23/2024    " A1C 11.6 05/13/2024     Diabetes Complications: +retinopathy with macular edema, +peripheral neuropathy, +nephropathy, microalbuminuria  History of DKA: no  Able to Detect Hypoglycemia: yes  Usual Diabetes Care Provider: Yessi Gray NP    No past medical history on file.    Past Surgical History:   Procedure Laterality Date     COLONOSCOPY N/A 5/23/2018    Procedure: COLONOSCOPY;  colonoscopy;  Surgeon: Federico Lovett MD;  Location: [facility]       No family history on file.  Mom- diabetes, kidney failure, MI  Dad- skin cancer, lived to 93  Sister- diabetes, MI in 60s.   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  Lives with  and son.   Socioeconomic History     Marital status:    Tobacco Use     Smoking status: Former     Types: Cigarettes     Smokeless tobacco: Never   Vaping Use     Vaping status: Never Used     Social Drivers of Health     Financial Resource Strain: Low Risk  (5/13/2024)    Financial Resource Strain      Within the past 12 months, have you or your family members you live with been unable to get utilities (heat, electricity) when it was really needed?: No   Food Insecurity: Low Risk  (5/13/2024)    Food Insecurity      Within the past 12 months, did you worry that your food would run out before you got money to buy more?: No      Within the past 12 months, did the food you bought just not last and you didn t have money to get more?: No   Transportation Needs: Low Risk  (5/13/2024)    Transportation Needs      Within the past 12 months, has lack of transportation kept you from medical appointments, getting your medicines, non-medical meetings or appointments, work, or from getting things that you need?: No   Physical Activity: Insufficiently Active (5/13/2024)    Exercise Vital Sign      Days of Exercise per Week: 2 days      Minutes of Exercise per Session: 10 min   Stress: Stress Concern  Present (5/13/2024)    [place] of [employer]ational [employer] - Occupational Stress Questionnaire      Feeling of Stress : To some extent   Social Connections: Unknown (5/13/2024)    Social Connection and Isolation Panel [NHANES]      Frequency of Social Gatherings with Friends and Family: Never   Interpersonal Safety: Low Risk  (2/21/2025)    Interpersonal Safety      Do you feel physically and emotionally safe where you currently live?: Yes      Within the past 12 months, have you been hit, slapped, kicked or otherwise physically hurt by someone?: No      Within the past 12 months, have you been humiliated or emotionally abused in other ways by your partner or ex-partner?: No   Housing Stability: Low Risk  (5/13/2024)    Housing Stability      Do you have housing? : Yes      Are you worried about losing your housing?: No       Current Outpatient Medications   Medication Sig Dispense Refill     albuterol (PROAIR HFA/PROVENTIL HFA/VENTOLIN HFA) 108 (90 Base) MCG/ACT inhaler Inhale 1-2 puffs into the lungs every 4 hours as needed for shortness of breath, wheezing or cough. 18 g 1     atorvastatin (LIPITOR) 40 MG tablet Take 1 tablet (40 mg) by mouth daily 90 tablet 3     blood glucose (ACCU-CHEK GUIDE) test strip Use to test blood sugar 3 times daily 100 strip 11     blood glucose monitoring (SOFTCLIX) lancets USE 1 TO CHECK GLUCOSE ONCE DAILY       Blood Glucose Monitoring Suppl (ACCU-CHEK GUIDE ME) w/Device KIT daily       dulaglutide (TRULICITY) 1.5 MG/0.5ML pen Inject 1.5 mg subcutaneously every 7 days. 6 mL 0     insulin glargine (BASAGLAR PEN) 100 UNIT/ML pen Inject 80 Units subcutaneously at bedtime. 75 mL 0     LANTUS SOLOSTAR 100 UNIT/ML soln Inject 80 Units subcutaneously 2 times daily. 144 mL 0     lisinopril-hydrochlorothiazide (ZESTORETIC) 20-12.5 MG tablet Take 2 tablets by mouth once daily 180 tablet 1     metFORMIN (GLUCOPHAGE XR) 500 MG 24 hr tablet Take 2 tablets (1,000 mg) by mouth 2 times daily  "(with meals). 360 tablet 1     metoprolol succinate ER (TOPROL XL) 50 MG 24 hr tablet Take 1 tablet by mouth once daily 90 tablet 1     TRULICITY 0.75 MG/0.5ML pen INJECT 0.75 MG SUBCUTANEOUSLY ONCE A WEEK 4 mL 0     No current facility-administered medications for this visit.          Allergies   Allergen Reactions     Penicillins Hives and Rash     Hives on face       Physical Exam  BP (!) 160/73 (BP Location: Right arm, Patient Position: Sitting, Cuff Size: Adult Small)   Pulse 62   Ht 1.6 m (5' 3\")   Wt 90.3 kg (199 lb)   SpO2 96%   BMI 35.25 kg/m    GENERAL:  Alert and oriented X3, NAD, well dressed, answering questions appropriately, appears stated age.  HEENT: OP clear, no lymphadenopathy, no thyromegaly, non-tender, no exophthalmus, no proptosis, EOMI, no lid lag, no retraction  ABDOMEN: soft, nontender, nondistended, significant central obesity with +dark stretch marks.  EXTREMITIES: thin extremities. +pulses, no rashes, no lesions  Diabetic foot exam: normal DP and PT pulses, no trophic changes or ulcerative lesions, and normal sensory exam.  +onychomycosis.       RESULTS  Lab Results   Component Value Date    A1C 11.1 (H) 02/21/2025    A1C 11.0 (H) 09/23/2024    A1C 11.6 (H) 05/13/2024       TSH   Date Value Ref Range Status   02/21/2025 4.30 (H) 0.30 - 4.20 uIU/mL Final     Free T4   Date Value Ref Range Status   02/21/2025 1.33 0.90 - 1.70 ng/dL Final       No results found for: \"ALT\"]    Recent Labs   Lab Test 05/13/24  1438   CHOL 87   HDL 30*   LDL 5   TRIG 259*       Lab Results   Component Value Date     05/13/2024      Lab Results   Component Value Date    POTASSIUM 4.5 05/13/2024     Lab Results   Component Value Date    CHLORIDE 100 05/13/2024     Lab Results   Component Value Date    KELLY 9.8 05/13/2024     Lab Results   Component Value Date    CO2 29 05/13/2024     Lab Results   Component Value Date    BUN 16.4 05/13/2024     Lab Results   Component Value Date    CR 0.94 05/13/2024 " "      GFR Estimate   Date Value Ref Range Status   05/13/2024 68 >60 mL/min/1.73m2 Final     No results found for: \"GFRESTBLACK\"    Lab Results   Component Value Date    MICROL 136.6 02/21/2025     No results found for: \"MICROALBUMIN\"  No results found for: \"CPEPT\", \"GADAB\", \"ISCAB\"    No results found for: \"B12\"]    No results found for: \"TTG\", \"TTGG\"      Most recent eye exam date: : Not Found     Computed FIB-4 Calculation unavailable. One or more values for this score either were not found within the given timeframe or did not fit some other criterion.  A value of FIB-4 below 1.30 is considered as low risk for advanced fibrosis; a value of FIB-4 over 2.67 is considered as high risk for advanced fibrosis; and FIB-4 values between 1.30 and 2.67 are considered as intermediate risk of advanced fibrosis.    No results found for: \"GADAB\"  No results found for: \"CPEPT\"        Assessment/Plan:     1.  Poorly controlled diabetes, based on A1c of 11.3 today. She has high insulin requirements.  Will check antibodies, c-peptide, glucose, 8am cortisol, 24 hour urine for cortisol to rule out Cushing's. She is on very high doses of lantus and no short acting insulin.  I would like her to start mealtime insulin (humalog U200) and gradually lower lantus (and likely switch to tresiba U200), but I have no glucose data to review today.  Will start dexcom sensor today and review data in a couple of weeks with diabetes educator.  Will try to get coverage for Mounjaro and switch from trulicity for better glucose control/weight loss.  She would benefit from SGLT-2 inhibitor, so I did place a test claim for jardiance.  Will review all of this with [facility] pharmacy (cost/coverage) and insulin titration with DM education.      Start a glucose sensor.     Meet with [facility] pharmacy to review your treatment plan/cost.     Start Mounjaro 5 mg weekly.  Stop Trulicity when you start this.      Please let me know if you are having low blood sugars less " than 70 or over 250 mg/dL.      If you have concerns, please send me a [employer] message M-Th, call the clinic at [phone], or call [phone] after hours/weekends and ask to speak with the endocrinologist on call.      2.  Risk factors-     Retinopathy:  Yes. Had eye exam within last year. Will be seeing a retina specialist.  Nephropathy:  Yes. BP has been high.  She does have microalbuminuria.  Would benefit from SGLT-2 inhibitor for kidney protection.  Again, will look into cost/coverage.  GFR 68.   Neuropathy: Yes, currently mild.   Feet: OK, no ulcers.   Lipids:  LDL at target.  5! Patient taking statin  Thyroid screening: TSH slightly elevated at last check.  Will recheck and add antibodies.     3.  F/U in 4 mos with me, sooner with concerns.     63 minutes spent on the date of the encounter doing chart review, review of test results, patient visit and documentation, counseling/coordination of care, and discussion of follow up plan for worsening hyper and hypoglycemia.   The patient understood and is satisfied with today's visit.        Emerald Purvis PA-C, MPAS   [facility]  Department of Medicine  Division of Endocrinology and Diabetes        Again, thank you for allowing me to participate in the care of your patient.      Sincerely,    Emerald Purvis PA-C

## 2025-05-13 ENCOUNTER — PATIENT OUTREACH (OUTPATIENT)
Dept: CARE COORDINATION | Facility: CLINIC | Age: 65
End: 2025-05-13
Payer: COMMERCIAL

## 2025-05-13 LAB
C PEPTIDE SERPL-MCNC: 1.8 NG/ML (ref 0.9–6.9)
FASTING STATUS PATIENT QL REPORTED: NO
THYROPEROXIDASE AB SERPL-ACNC: 26 IU/ML

## 2025-05-13 NOTE — PROGRESS NOTES
Social Work Intervention  [facility]    Data/Intervention:    Patient Name:  Viktoriya Land  /Age:  1960 (64 year old)    Visit Type: telephone  Referral Source: Emerald Purvis PA-C  Reason for Referral:  new to Medicare in August    Collaborated With:    -Viktoriya    Psychosocial Information/Concerns:  Viktoriya starts Medicare in August. She currently has a plan thru [facility] marketplace and she indicated that coverage has not been great. Both she and her husb have outstanding medical bills here.  She is wondering about finding a medicare plan that covers medications the best.     Intervention/Education/Resources Provided:  Reviewed Parts of Medicare and costs of Part B. Discussed their incomes as that will help determine any financial assistance programs available. She will not be able to afford a Medicare Medigap plan so will need to choose amongst the Advantage plans. Suggested she contact the Sr Linkage Line and provided the phone # to help her compare options. Suggested choosing a plan with a lower medication deductible. Reviewed max out of pocket per year for meds is now $2000. She does not qualify for any Medicare savings programs or low income subsidy.   They do receive some SNAP benefits. Reviewed the [employer] Financial Assistance/Shanae care criteria and she thinks her  is applying for that. Also reviewed the criteria for Energy Assistance and encouraged her to go online and apply before May 31st.     Assessment/Plan:  Unfortunately their incomes are just above what is allowed for the available medicare programs. She indicated she would work on the other applications and contact the SR Linkage Line for medicare plan assistance. Encouraged her to contact me with any questions.     Provided patient/family with contact information and availability.    Jessica Durant, MSW, [facility]    [facility]  [address] [place] [address], [address] [place] [address]  [id number]    [phone]

## 2025-05-13 NOTE — TELEPHONE ENCOUNTER
MTM referral in place.     Leela Pabon, RN on 5/13/2025 at 11:59 AM     RE    PRIOR AUTHORIZATION DENIED    Medication: MOUNJARO 5 MG/0.5ML SC SOAJ  Insurance Company: [employer] [place] - Phone [phone] Fax [phone]  Denial Date: 5/12/2025  Denial Reason(s): See denial letter  Appeal Information: See denial letter

## 2025-05-14 LAB — GAD65 AB SER IA-ACNC: <5 IU/ML

## 2025-05-15 LAB
PANC ISLET CELL AB TITR SER: NORMAL {TITER}
ZNT8 AB SERPL IA-ACNC: <10 U/ML

## 2025-05-19 ENCOUNTER — VIRTUAL VISIT (OUTPATIENT)
Dept: EDUCATION SERVICES | Facility: CLINIC | Age: 65
End: 2025-05-19
Attending: PHYSICIAN ASSISTANT
Payer: COMMERCIAL

## 2025-05-19 DIAGNOSIS — E11.311 TYPE 2 DIABETES MELLITUS WITH RETINOPATHY AND MACULAR EDEMA, WITH LONG-TERM CURRENT USE OF INSULIN, UNSPECIFIED LATERALITY, UNSPECIFIED RETINOPATHY SEVERITY (H): Primary | ICD-10-CM

## 2025-05-19 DIAGNOSIS — Z79.4 TYPE 2 DIABETES MELLITUS WITH RETINOPATHY AND MACULAR EDEMA, WITH LONG-TERM CURRENT USE OF INSULIN, UNSPECIFIED LATERALITY, UNSPECIFIED RETINOPATHY SEVERITY (H): Primary | ICD-10-CM

## 2025-05-19 PROCEDURE — 99207 PR NO CHARGE LOS: CPT | Mod: 93 | Performed by: DIETITIAN, REGISTERED

## 2025-05-19 RX ORDER — ACYCLOVIR 400 MG/1
TABLET ORAL
Qty: 1 EACH | Refills: 0 | Status: SHIPPED | OUTPATIENT
Start: 2025-05-19

## 2025-05-19 NOTE — PROGRESS NOTES
Virtual Visit Details    Type of service:  Video Visit     Originating Location (pt. Location): Home    Distant Location (provider location):  On-site  Platform used for Video Visit: Rick

## 2025-05-19 NOTE — PROGRESS NOTES
Diabetes Self-Management Education & Support Visit- Short    Viktoriya Land presented today for education related to Type 2 diabetes    Patient is being treated with:  Diet, Insulin (less than 3 injections daily), and GLP-1 Agonist    Purpose of today's visit:  review Dexcom G7 data, assess insulin regimen      Subjective/Objective:          Diabetes Medication(s)       Biguanides       metFORMIN (GLUCOPHAGE XR) 500 MG 24 hr tablet Take 2 tablets (1,000 mg) by mouth 2 times daily (with meals).       Insulin       insulin glargine (BASAGLAR PEN) 100 UNIT/ML pen Inject 80 Units subcutaneously at bedtime.     Insulin Glargine-yfgn 100 UNIT/ML SOPN INJECT 80 UNITS SUBCUTANEOUSLY AT BEDTIME     Patient not taking: Reported on 5/12/2025     LANTUS SOLOSTAR 100 UNIT/ML soln Inject 80 Units subcutaneously 2 times daily.     Patient not taking: Reported on 5/12/2025       Incretin Mimetic Agents       dulaglutide (TRULICITY) 1.5 MG/0.5ML pen Inject 1.5 mg subcutaneously every 7 days.     tirzepatide (MOUNJARO) 5 MG/0.5ML SOAJ auto-injector pen Inject 0.5 mLs (5 mg) subcutaneously once a week.            Assessment/Plan:  Viktoriya called for follow-up after starting Dexcom G7 sensor in clinic. Says her insurance covered the Dexcom G7 without issues. Time in Range (70-180mg/dL) is 18%, glucoses running high throughout the day. Taking Lantus 80 units twice daily and trulicity 1.5 mg weekly. Previously taking Mounjaro but it was no longer covered. Most recent PA for Mounjaro was denied, needs to try Ozempic first. However, Ozempic not a good option as it is associated with worsening retinopathy, Emerald Purvis PA-C increased dose of Trulicty to 3 mg weekly.   Viktori[place] lives near [place], [place] and would prefer to follow up with diabetes education there. Scheduled follow-up with Rachel LUZ.    Follow-up:  Scheduled with Rachel LUZ      Time spent in this visit: 15 minutes     Any diabetes medication  dose changes were made via the CDE Protocol and Collaborative Practice Agreement. A copy of this encounter was provided to the patient's referring provider.

## 2025-05-19 NOTE — NURSING NOTE
Current patient location: [address] [place], [place] [address]    [facility] [address]    Is the patient currently in the state of [place]? YES    Visit mode: Convert to telephone    If the visit is dropped, the patient can be reconnected by:VIDEO VISIT: Text to cell phone:   Telephone Information:   Mobile [phone]    and VIDEO VISIT: Send to e-mail at: [email or web address]    Will anyone else be joining the visit? NO  (If patient encounters technical issues they should call [phone] :[phone])    Are changes needed to the allergy or medication list? Pt stated no changes to allergies and Pt stated no med changes    Are refills needed on medications prescribed by this physician? NO    Rooming Documentation:  Not applicable    Reason for visit: No Show and Diabetes Education    Catherine Negron VVF    No vitals today

## 2025-05-19 NOTE — Clinical Note
Hi,  Viktoriya was able to  more Dexcom G7 sensors from the pharmacy, says her insurance covers the sensors. I've scheduled follow-up for her with [facility] in [place] as Viktoriya prefers to work with an educator closer to home.  Reach out with any questions.  Angeline, Mary Alice

## 2025-05-20 ENCOUNTER — RESULTS FOLLOW-UP (OUTPATIENT)
Dept: ENDOCRINOLOGY | Facility: CLINIC | Age: 65
End: 2025-05-20
Payer: COMMERCIAL

## 2025-05-20 DIAGNOSIS — Z79.4 TYPE 2 DIABETES MELLITUS WITH RETINOPATHY AND MACULAR EDEMA, WITH LONG-TERM CURRENT USE OF INSULIN, UNSPECIFIED LATERALITY, UNSPECIFIED RETINOPATHY SEVERITY (H): Primary | ICD-10-CM

## 2025-05-20 DIAGNOSIS — E11.311 TYPE 2 DIABETES MELLITUS WITH RETINOPATHY AND MACULAR EDEMA, WITH LONG-TERM CURRENT USE OF INSULIN, UNSPECIFIED LATERALITY, UNSPECIFIED RETINOPATHY SEVERITY (H): Primary | ICD-10-CM

## 2025-05-20 RX ORDER — DULAGLUTIDE 3 MG/.5ML
3 INJECTION, SOLUTION SUBCUTANEOUS WEEKLY
Qty: 2 ML | Refills: 11 | OUTPATIENT
Start: 2025-05-20

## 2025-05-20 NOTE — PATIENT INSTRUCTIONS
Juaquin Sadler,    It was nice meeting with you. Here is a summary of our visit and plan:    Continue wearing Dexcom G7 sensor  Increase Trulicity to 3 mg weekly          Please call or Swipelyhart if you have any questions.      Contact information:   If you have concerns, please send a Streamup message or call the clinic at [phone].    For more urgent concerns that do not require 911, please call [phone] after hours/weekends and ask to speak with the Endocrinologist on call.    To schedule a Diabetes Education appointment, call [phone]    Please let us know if you are having low blood sugars less than 70 or over 300 mg/dL.  Do not wait until your next appointment if this is happening.

## 2025-05-21 ENCOUNTER — TELEPHONE (OUTPATIENT)
Dept: ENDOCRINOLOGY | Facility: CLINIC | Age: 65
End: 2025-05-21
Payer: COMMERCIAL

## 2025-05-21 NOTE — TELEPHONE ENCOUNTER
Must try and fail at least 2 of the following:     Provider notified.   Leela Pabon RN on 5/21/2025 at 1:51 PM               RE    PRIOR AUTHORIZATION DENIED    Medication: TRULICITY 3 MG/0.5ML SC SOAJ  Insurance Company: [employer] [place] - Phone [phone] Fax [phone]  Denial Date: 5/21/2025  Denial Reason(s): See denial letter  Appeal Information: See denial letter

## 2025-05-21 NOTE — TELEPHONE ENCOUNTER
PA Initiation    Medication: TRULICITY 3 MG/0.5ML SC SOAJ  [facility]: [facility] - Phone [phone] Fax [phone]  Pharmacy Filling the Rx:    Filling Pharmacy Phone:    Filling Pharmacy Fax:    Start Date: 5/21/2025    Key: [id number]

## 2025-06-13 ENCOUNTER — VIRTUAL VISIT (OUTPATIENT)
Dept: PHARMACY | Facility: CLINIC | Age: 65
End: 2025-06-13
Attending: PHYSICIAN ASSISTANT
Payer: COMMERCIAL

## 2025-06-13 NOTE — PROGRESS NOTES
Medication Therapy Management (MTM) Encounter    ASSESSMENT:                            Medication Adherence/Access: talked with pharmacy - Trulicity went thru ins for $25 copay; no appeal needed    Diabetes:  Patient is not meeting A1c goal of <7. She has been doing well with her Trulicity, but has not been able to start increased dose. Will plan to  3 mg dose today since she has finished her current 1.5 mg box. Will continue to titrate to max dose of Trulicity to see its full effects, if more effective therpay is needed, can consider switching to Mounjaro at that time. Ozempic would not be ideal for her as she already has retinopathy concerns and studies have shown this to worsen with Ozempic.  We also discussed SGLT2i therapy today. Jardiance is no cost with her insurance. Provided education on mechanism, administration and side effects today. Patient would like to work on adjusting Trulicity and insulin for now before starting Jardiance. Will continue follow-up with patient for management.    PLAN:                            Increase to Trulicity 3 mg once weekly  Continue follow-up with CDE to further adjust insulin    Follow-up: 7/9/2025    SUBJECTIVE/OBJECTIVE:                          Viktoriya Land is a 64 year old female called for an initial visit. She was referred to me from Emerald Purvis      Reason for visit: diabetes medication coverage and costs.    Allergies/ADRs: Reviewed in chart  Past Medical History: Reviewed in chart  Tobacco: She reports that she has quit smoking. Her smoking use included cigarettes. She has never used smokeless tobacco.  Alcohol: not currently using    Medication Adherence/Access: Trulicity denied; Jardiance $0 copay    Diabetes   Patient diagnosed with Type 2 diabetes   Current Medications:  Trulicity 1.5 mg once weekly - was supposed to increase to 3 mg but has not yet; had enough medication at home  Metformin 1000 mg twice a day   Basaglar 80 units every morning  "and every evening     Patient is not experiencing side effects.  Current diabetes symptoms: none  Blood sugar monitoring: Continuous Glucose Monitor see AGP below        Lab Results   Component Value Date    A1C 11.3 (H) 05/12/2025    A1C 11.1 (H) 02/21/2025    A1C 11.0 (H) 09/23/2024     Estimated body mass index is 35.25 kg/m  as calculated from the following:    Height as of 5/12/25: 5' 3\" (1.6 m).    Weight as of 5/12/25: 199 lb (90.3 kg).           Today's Vitals: There were no vitals taken for this visit.  ----------------      I spent 40 minutes with this patient today. All changes were made via collaborative practice agreement with Emerald Purvis.     A summary of these recommendations was sent via Freight Connection.    Avel GaleD  Diabetes & Endocrine MTM Pharmacist    Contact information:   To schedule a MTM appointment: [phone]  For questions or concerns, please send a Freight Connection message or call the clinic at [phone].    For more urgent concerns that do not require 537, please call [phone] after hours/weekends and ask to speak with the Endocrinologist on call.       Telemedicine Visit Details  The patient's medications can be safely assessed via a telemedicine encounter.  Type of service:  Telephone visit  Originating Location (pt. Location): Home  {PROVIDER LOCATION On-site should be selected for visits conducted from your clinic location or adjoining [facility] hospital, academic office, or other nearby [facility] building. Off-site should be selected for all other provider locations, including home:[id number]}  Distant Location (provider location):  Off-site  Start Time: 1 PM  End Time: 1:30 PM     Medication Therapy Recommendations  No medication therapy recommendations to display   "

## 2025-06-22 ENCOUNTER — HEALTH MAINTENANCE LETTER (OUTPATIENT)
Age: 65
End: 2025-06-22

## 2025-06-25 ENCOUNTER — TRANSFERRED RECORDS (OUTPATIENT)
Dept: HEALTH INFORMATION MANAGEMENT | Facility: CLINIC | Age: 65
End: 2025-06-25
Payer: COMMERCIAL

## 2025-06-25 LAB — RETINOPATHY: POSITIVE

## 2025-06-28 DIAGNOSIS — E78.5 HYPERLIPIDEMIA LDL GOAL <100: ICD-10-CM

## 2025-06-30 RX ORDER — ATORVASTATIN CALCIUM 40 MG/1
40 TABLET, FILM COATED ORAL DAILY
Qty: 90 TABLET | Refills: 0 | Status: SHIPPED | OUTPATIENT
Start: 2025-06-30

## 2025-07-02 DIAGNOSIS — Z79.4 UNCONTROLLED DIABETES MELLITUS WITH HYPERGLYCEMIA, WITH LONG-TERM CURRENT USE OF INSULIN (H): ICD-10-CM

## 2025-07-02 DIAGNOSIS — E11.65 UNCONTROLLED DIABETES MELLITUS WITH HYPERGLYCEMIA, WITH LONG-TERM CURRENT USE OF INSULIN (H): ICD-10-CM

## 2025-07-02 NOTE — TELEPHONE ENCOUNTER
Pt states directions need to be changed on this rx.  She takes it twice a day.  States she has one pen left.  Pharmacy will not refill because they say it is too soon.     Fadumo Dutta on 7/2/2025 at 3:55 PM

## 2025-07-03 DIAGNOSIS — E11.65 UNCONTROLLED DIABETES MELLITUS WITH HYPERGLYCEMIA, WITH LONG-TERM CURRENT USE OF INSULIN (H): ICD-10-CM

## 2025-07-03 DIAGNOSIS — Z79.4 UNCONTROLLED DIABETES MELLITUS WITH HYPERGLYCEMIA, WITH LONG-TERM CURRENT USE OF INSULIN (H): ICD-10-CM

## 2025-07-03 RX ORDER — INSULIN GLARGINE-YFGN 100 [IU]/ML
80 INJECTION, SOLUTION SUBCUTANEOUS 2 TIMES DAILY
Qty: 75 ML | Refills: 0 | Status: SHIPPED | OUTPATIENT
Start: 2025-07-03

## 2025-07-07 RX ORDER — INSULIN GLARGINE-YFGN 100 [IU]/ML
80 INJECTION, SOLUTION SUBCUTANEOUS 2 TIMES DAILY
Qty: 75 ML | Refills: 0 | OUTPATIENT
Start: 2025-07-07

## 2025-07-08 ENCOUNTER — TELEPHONE (OUTPATIENT)
Dept: ENDOCRINOLOGY | Facility: CLINIC | Age: 65
End: 2025-07-08

## 2025-07-08 NOTE — TELEPHONE ENCOUNTER
[facility]    Phone Message    May a detailed message be left on voicemail: yes     Reason for Call: Patient calling to discuss alternatives to her Trulicity. Her insurance is not going to cover her at a higher dose. Please call to discuss further.       Action Taken: Message routed to:  [facility] ENDO    Travel Screening: Not Applicable     Date of Service:

## 2025-07-09 ENCOUNTER — VIRTUAL VISIT (OUTPATIENT)
Dept: PHARMACY | Facility: CLINIC | Age: 65
End: 2025-07-09
Attending: PHYSICIAN ASSISTANT
Payer: COMMERCIAL

## 2025-07-09 DIAGNOSIS — Z79.4 TYPE 2 DIABETES MELLITUS WITH RETINOPATHY AND MACULAR EDEMA, WITH LONG-TERM CURRENT USE OF INSULIN (H): Primary | ICD-10-CM

## 2025-07-09 DIAGNOSIS — E11.311 TYPE 2 DIABETES MELLITUS WITH RETINOPATHY AND MACULAR EDEMA, WITH LONG-TERM CURRENT USE OF INSULIN (H): Primary | ICD-10-CM

## 2025-07-09 NOTE — PROGRESS NOTES
Medication Therapy Management (MTM) Encounter    ASSESSMENT:                            Medication Adherence/Access: see below for considerations    Diabetes:  Patient is not meeting A1c goal of <7. She has been doing well with her Trulicity, but still coming across insurance issues. Since both Trulicity and Mounjaro are requiring an appeal for approval, will opt for more effective therapy (Mounjaro). MTM to work on appeal for this. Will continue follow-up with patient for management.    PLAN:                            I will work on an appeal for Mounjaro  Continue your current insulin dosing  Let me know once you receive your new insurance card for Medicare part D    Follow-up: 8/1/2025 w/ Nisha    SUBJECTIVE/OBJECTIVE:                          Viktoriya Land is a 64 year old female called for a follow-up visit. She was referred to me from Emerald Purvis      Reason for visit: diabetes medication coverage and costs.    Allergies/ADRs: Reviewed in chart  Past Medical History: Reviewed in chart  Tobacco: She reports that she has quit smoking. Her smoking use included cigarettes. She has never used smokeless tobacco.  Alcohol: not currently using    Medication Adherence/Access: per [facility] pharmacy associate - Trulicity requiring PA again. Patient also switching to Medicare ins next month since she will turn 65  Diabetes   Patient diagnosed with Type 2 diabetes   Current Medications:  Trulicity 3 mg once weekly - was supposed to increase to 3 mg but has not yet; had enough medication at home  --has no doses left - took last dose on Monday   Metformin 1000 mg twice a day   Basaglar 70 units every morning and every evening - reduced from 80 units after having some lows    Patient is not experiencing side effects.  Current diabetes symptoms: none  Blood sugar monitoring: Continuous Glucose Monitor see AGP below        Lab Results   Component Value Date    A1C 11.3 (H) 05/12/2025    A1C 11.1 (H) 02/21/2025    A1C 11.0  "(H) 09/23/2024     Estimated body mass index is 35.25 kg/m  as calculated from the following:    Height as of 5/12/25: 5' 3\" (1.6 m).    Weight as of 5/12/25: 199 lb (90.3 kg).           Today's Vitals: There were no vitals taken for this visit.  ----------------    I spent 20 minutes with this patient today. All changes were made via collaborative practice agreement with Emerald Purvis     A summary of these recommendations was sent via Microblr.    Nisha Auguste PharmD  Diabetes & Endocrine MTM Pharmacist    Contact information:   To schedule a MTM appointment: [phone]  For questions or concerns, please send a Microblr message or call the clinic at [phone].    For more urgent concerns that do not require 911, please call [phone] after hours/weekends and ask to speak with the Endocrinologist on call.       Telemedicine Visit Details  The patient's medications can be safely assessed via a telemedicine encounter.  Type of service:  Telephone visit  Originating Location (pt. Location): Home    Distant Location (provider location):  Off-site  Start Time: 3 PM  End Time: 3:20 PM     Medication Therapy Recommendations  No medication therapy recommendations to display   "

## 2025-07-09 NOTE — PATIENT INSTRUCTIONS
"Recommendations from today's MTM visit:                                                      I will work on an appeal for Mounjaro  Continue your current insulin dosing  Let me know once you receive your new insurance card for Medicare part D    Follow-up: 8/1/2025 katiuska/ Nisha    It was great speaking with you today.  I value your experience and would be very thankful for your time in providing feedback in our clinic survey. In the next few days, you may receive an email or text message from Agent Panda with a link to a survey related to your  clinical pharmacist.\"     To schedule another MTM appointment, please call the clinic directly or you may call the MTM scheduling line at [phone].    My Clinical Pharmacist's contact information:                                                      Please feel free to contact me with any questions or concerns you have.      Nisha Auguste, PharmD  Diabetes & Endocrine MTM Pharmacist    Contact information:   To schedule a MTM appointment: [phone]  For questions or concerns, please send a Zong message or call the clinic at [phone].    For more urgent concerns that do not require 292, please call [phone] after hours/weekends and ask to speak with the Endocrinologist on call.      "

## 2025-07-14 ENCOUNTER — TELEPHONE (OUTPATIENT)
Dept: EDUCATION SERVICES | Facility: CLINIC | Age: 65
End: 2025-07-14
Payer: COMMERCIAL

## 2025-07-14 NOTE — TELEPHONE ENCOUNTER
Left Voicemail (1st Attempt) and Sent Decibel Music Systemshart (1st Attempt) for the patient to call back and schedule the following:    Appointment type: Diabetes Edu  Provider: Matthias Oneal RD  Return date: next available in person  Specialty phone number: [phone]  Additional appointment(s) needed: N/A  Additonal Notes: Codi HART    Provider Name:   MATTHIAS ONEAL [[id number]]            Follow-Up for What?   Diabetes            Service:   60 Minutes            How?   In-Person            Can this be self-scheduled online?   Yes        Flora Gonzalez on 7/14/2025 at 4:39 PM

## 2025-07-16 ENCOUNTER — TELEPHONE (OUTPATIENT)
Dept: EDUCATION SERVICES | Facility: CLINIC | Age: 65
End: 2025-07-16
Payer: COMMERCIAL

## 2025-07-16 NOTE — TELEPHONE ENCOUNTER
Patient Contacted for the patient to call back and schedule the following:    Appointment type: Diabetes Education  Provider: Mary Alice Tellez RD, LD  Return date: First Available  Specialty phone number: [phone]  Additional appointment(s) needed: N/A  Additonal Notes: Spoke to patient and she said at this time she did not want to reschedule with CDE.  Told her I'd sent a message in Smeet with the details and she could call the number to schedule when ready.    Chris Steward on 7/16/2025 at 8:27 AM

## 2025-07-18 NOTE — TELEPHONE ENCOUNTER
Dulaglutide (TRULICITY) 3 MG/0.5ML SOAJ [[id number]]    Emerald Purvis, PA-C  [facility] - [place], [place] - [address] [facility] would like an update and to her request for alternative or PA   Pls call.  Her mychart is not available

## 2025-07-21 ENCOUNTER — TELEPHONE (OUTPATIENT)
Dept: ENDOCRINOLOGY | Facility: CLINIC | Age: 65
End: 2025-07-21
Payer: COMMERCIAL

## 2025-07-21 NOTE — TELEPHONE ENCOUNTER
Left patient VM to let her know we are still working on the PA for Mounjaro. Looks like patient had update to her insurance since our last visit. Please see TE for Mounjaro PA date 7/21/2025.    Nisha Auguste, PharmD  Diabetes & Endocrine MTM Pharmacist

## 2025-07-21 NOTE — TELEPHONE ENCOUNTER
PA Initiation    Medication: MOUNJARO 5 MG/0.5ML SC SOAJ  Insurance Company: [employer] - Phone [phone] Fax [phone]  Pharmacy Filling the Rx:    Filling Pharmacy Phone:    Filling Pharmacy Fax:    Start Date: 7/21/2025    Key: [id number]        Thank you,     Leesa Magana, [facility]  Clinic Liaison  [facility]

## 2025-07-24 NOTE — TELEPHONE ENCOUNTER
Prior Authorization Approval    Medication: MOUNJARO 5 MG/0.5ML SC SOAJ  Authorization Effective Date: 6/21/2025  Authorization Expiration Date: 7/21/2026  Approved Dose/Quantity: uud  Reference #: Key: [id number]   Insurance Company: [employer] - Phone [phone] Fax [phone]  Expected CoPay: $    CoPay Card Available:      Financial Assistance Needed:    Which Pharmacy is filling the prescription:

## 2025-08-15 DIAGNOSIS — Z79.4 UNCONTROLLED DIABETES MELLITUS WITH HYPERGLYCEMIA, WITH LONG-TERM CURRENT USE OF INSULIN (H): ICD-10-CM

## 2025-08-15 DIAGNOSIS — E11.65 UNCONTROLLED DIABETES MELLITUS WITH HYPERGLYCEMIA, WITH LONG-TERM CURRENT USE OF INSULIN (H): ICD-10-CM

## 2025-08-18 RX ORDER — INSULIN GLARGINE 100 [IU]/ML
INJECTION, SOLUTION SUBCUTANEOUS
Qty: 75 ML | Refills: 0 | Status: SHIPPED | OUTPATIENT
Start: 2025-08-18

## 2025-08-24 ENCOUNTER — HEALTH MAINTENANCE LETTER (OUTPATIENT)
Age: 65
End: 2025-08-24

## (undated) RX ORDER — LIDOCAINE HYDROCHLORIDE 10 MG/ML
INJECTION, SOLUTION EPIDURAL; INFILTRATION; INTRACAUDAL; PERINEURAL
Status: DISPENSED
Start: 2018-05-23